# Patient Record
Sex: FEMALE | Race: WHITE | NOT HISPANIC OR LATINO | URBAN - METROPOLITAN AREA
[De-identification: names, ages, dates, MRNs, and addresses within clinical notes are randomized per-mention and may not be internally consistent; named-entity substitution may affect disease eponyms.]

---

## 2017-11-05 ENCOUNTER — INPATIENT (INPATIENT)
Facility: HOSPITAL | Age: 82
LOS: 1 days | Discharge: HOME | DRG: 312 | End: 2017-11-07
Attending: FAMILY MEDICINE | Admitting: FAMILY MEDICINE
Payer: MEDICARE

## 2017-11-05 VITALS
OXYGEN SATURATION: 98 % | RESPIRATION RATE: 18 BRPM | HEART RATE: 76 BPM | HEIGHT: 63 IN | WEIGHT: 149.91 LBS | DIASTOLIC BLOOD PRESSURE: 72 MMHG | SYSTOLIC BLOOD PRESSURE: 154 MMHG

## 2017-11-05 DIAGNOSIS — Z98.42 CATARACT EXTRACTION STATUS, LEFT EYE: Chronic | ICD-10-CM

## 2017-11-05 DIAGNOSIS — Z87.440 PERSONAL HISTORY OF URINARY (TRACT) INFECTIONS: ICD-10-CM

## 2017-11-05 DIAGNOSIS — I48.2 CHRONIC ATRIAL FIBRILLATION: ICD-10-CM

## 2017-11-05 DIAGNOSIS — D72.829 ELEVATED WHITE BLOOD CELL COUNT, UNSPECIFIED: ICD-10-CM

## 2017-11-05 DIAGNOSIS — R55 SYNCOPE AND COLLAPSE: ICD-10-CM

## 2017-11-05 DIAGNOSIS — Z87.81 PERSONAL HISTORY OF (HEALED) TRAUMATIC FRACTURE: Chronic | ICD-10-CM

## 2017-11-05 DIAGNOSIS — Z98.41 CATARACT EXTRACTION STATUS, RIGHT EYE: Chronic | ICD-10-CM

## 2017-11-05 DIAGNOSIS — N18.3 CHRONIC KIDNEY DISEASE, STAGE 3 (MODERATE): ICD-10-CM

## 2017-11-05 DIAGNOSIS — Z79.02 LONG TERM (CURRENT) USE OF ANTITHROMBOTICS/ANTIPLATELETS: ICD-10-CM

## 2017-11-05 DIAGNOSIS — R74.8 ABNORMAL LEVELS OF OTHER SERUM ENZYMES: ICD-10-CM

## 2017-11-05 DIAGNOSIS — J45.909 UNSPECIFIED ASTHMA, UNCOMPLICATED: ICD-10-CM

## 2017-11-05 DIAGNOSIS — Z96.652 PRESENCE OF LEFT ARTIFICIAL KNEE JOINT: Chronic | ICD-10-CM

## 2017-11-05 DIAGNOSIS — Z96.641 PRESENCE OF RIGHT ARTIFICIAL HIP JOINT: Chronic | ICD-10-CM

## 2017-11-05 LAB
ALBUMIN SERPL ELPH-MCNC: 3.6 G/DL — SIGNIFICANT CHANGE UP (ref 3.3–5)
ALP SERPL-CCNC: 97 U/L — SIGNIFICANT CHANGE UP (ref 30–120)
ALT FLD-CCNC: 75 U/L DA — HIGH (ref 10–60)
ANION GAP SERPL CALC-SCNC: 12 MMOL/L — SIGNIFICANT CHANGE UP (ref 5–17)
APPEARANCE UR: CLEAR — SIGNIFICANT CHANGE UP
APTT BLD: 28.5 SEC — SIGNIFICANT CHANGE UP (ref 27.5–37.4)
AST SERPL-CCNC: 72 U/L — HIGH (ref 10–40)
BACTERIA # UR AUTO: NEGATIVE — SIGNIFICANT CHANGE UP
BASOPHILS # BLD AUTO: 0.1 K/UL — SIGNIFICANT CHANGE UP (ref 0–0.2)
BASOPHILS NFR BLD AUTO: 0.5 % — SIGNIFICANT CHANGE UP (ref 0–2)
BILIRUB SERPL-MCNC: 0.7 MG/DL — SIGNIFICANT CHANGE UP (ref 0.2–1.2)
BILIRUB UR-MCNC: NEGATIVE — SIGNIFICANT CHANGE UP
BUN SERPL-MCNC: 23 MG/DL — SIGNIFICANT CHANGE UP (ref 7–23)
CALCIUM SERPL-MCNC: 9.4 MG/DL — SIGNIFICANT CHANGE UP (ref 8.4–10.5)
CHLORIDE SERPL-SCNC: 99 MMOL/L — SIGNIFICANT CHANGE UP (ref 96–108)
CK MB BLD-MCNC: 1.4 % — SIGNIFICANT CHANGE UP (ref 0–3.5)
CK MB BLD-MCNC: 1.5 % — SIGNIFICANT CHANGE UP (ref 0–3.5)
CK MB CFR SERPL CALC: 1.3 NG/ML — SIGNIFICANT CHANGE UP (ref 0–3.6)
CK MB CFR SERPL CALC: 1.4 NG/ML — SIGNIFICANT CHANGE UP (ref 0–3.6)
CK SERPL-CCNC: 92 U/L — SIGNIFICANT CHANGE UP (ref 26–192)
CK SERPL-CCNC: 93 U/L — SIGNIFICANT CHANGE UP (ref 26–192)
CO2 SERPL-SCNC: 27 MMOL/L — SIGNIFICANT CHANGE UP (ref 22–31)
COLOR SPEC: YELLOW — SIGNIFICANT CHANGE UP
CREAT SERPL-MCNC: 1.14 MG/DL — SIGNIFICANT CHANGE UP (ref 0.5–1.3)
DIFF PNL FLD: ABNORMAL
EOSINOPHIL # BLD AUTO: 0.2 K/UL — SIGNIFICANT CHANGE UP (ref 0–0.5)
EOSINOPHIL NFR BLD AUTO: 1.3 % — SIGNIFICANT CHANGE UP (ref 0–6)
EPI CELLS # UR: SIGNIFICANT CHANGE UP
GLUCOSE BLDC GLUCOMTR-MCNC: 122 MG/DL — HIGH (ref 70–99)
GLUCOSE SERPL-MCNC: 113 MG/DL — HIGH (ref 70–99)
GLUCOSE UR QL: NEGATIVE MG/DL — SIGNIFICANT CHANGE UP
HCT VFR BLD CALC: 46.3 % — HIGH (ref 34.5–45)
HGB BLD-MCNC: 14 G/DL — SIGNIFICANT CHANGE UP (ref 11.5–15.5)
INR BLD: 1.15 RATIO — SIGNIFICANT CHANGE UP (ref 0.88–1.16)
KETONES UR-MCNC: NEGATIVE — SIGNIFICANT CHANGE UP
LEUKOCYTE ESTERASE UR-ACNC: ABNORMAL
LYMPHOCYTES # BLD AUTO: 20.1 % — SIGNIFICANT CHANGE UP (ref 13–44)
LYMPHOCYTES # BLD AUTO: 3.3 K/UL — SIGNIFICANT CHANGE UP (ref 1–3.3)
MCHC RBC-ENTMCNC: 26 PG — LOW (ref 27–34)
MCHC RBC-ENTMCNC: 30.2 GM/DL — LOW (ref 32–36)
MCV RBC AUTO: 86 FL — SIGNIFICANT CHANGE UP (ref 80–100)
MONOCYTES # BLD AUTO: 0.6 K/UL — SIGNIFICANT CHANGE UP (ref 0–0.9)
MONOCYTES NFR BLD AUTO: 3.9 % — SIGNIFICANT CHANGE UP (ref 2–14)
NEUTROPHILS # BLD AUTO: 12.2 K/UL — HIGH (ref 1.8–7.4)
NEUTROPHILS NFR BLD AUTO: 74.2 % — SIGNIFICANT CHANGE UP (ref 43–77)
NITRITE UR-MCNC: NEGATIVE — SIGNIFICANT CHANGE UP
PH UR: 6 — SIGNIFICANT CHANGE UP (ref 5–8)
PLATELET # BLD AUTO: 381 K/UL — SIGNIFICANT CHANGE UP (ref 150–400)
POTASSIUM SERPL-MCNC: 4.2 MMOL/L — SIGNIFICANT CHANGE UP (ref 3.5–5.3)
POTASSIUM SERPL-SCNC: 4.2 MMOL/L — SIGNIFICANT CHANGE UP (ref 3.5–5.3)
PROT SERPL-MCNC: 7.5 G/DL — SIGNIFICANT CHANGE UP (ref 6–8.3)
PROT UR-MCNC: 100 MG/DL
PROTHROM AB SERPL-ACNC: 12.6 SEC — SIGNIFICANT CHANGE UP (ref 9.8–12.7)
RBC # BLD: 5.39 M/UL — HIGH (ref 3.8–5.2)
RBC # FLD: 13.5 % — SIGNIFICANT CHANGE UP (ref 10.3–14.5)
RBC CASTS # UR COMP ASSIST: NEGATIVE /HPF — SIGNIFICANT CHANGE UP (ref 0–4)
SODIUM SERPL-SCNC: 138 MMOL/L — SIGNIFICANT CHANGE UP (ref 135–145)
SP GR SPEC: 1.01 — SIGNIFICANT CHANGE UP (ref 1.01–1.02)
TROPONIN I SERPL-MCNC: 0 NG/ML — LOW (ref 0.02–0.06)
TROPONIN I SERPL-MCNC: 0.01 NG/ML — LOW (ref 0.02–0.06)
UROBILINOGEN FLD QL: NEGATIVE MG/DL — SIGNIFICANT CHANGE UP
WBC # BLD: 16.4 K/UL — HIGH (ref 3.8–10.5)
WBC # FLD AUTO: 16.4 K/UL — HIGH (ref 3.8–10.5)
WBC UR QL: SIGNIFICANT CHANGE UP

## 2017-11-05 PROCEDURE — 70450 CT HEAD/BRAIN W/O DYE: CPT | Mod: 26

## 2017-11-05 PROCEDURE — 99291 CRITICAL CARE FIRST HOUR: CPT

## 2017-11-05 PROCEDURE — 93010 ELECTROCARDIOGRAM REPORT: CPT

## 2017-11-05 PROCEDURE — 71101 X-RAY EXAM UNILAT RIBS/CHEST: CPT | Mod: 26

## 2017-11-05 RX ORDER — FLUTICASONE PROPIONATE AND SALMETEROL 50; 250 UG/1; UG/1
1 POWDER ORAL; RESPIRATORY (INHALATION)
Qty: 0 | Refills: 0 | Status: DISCONTINUED | OUTPATIENT
Start: 2017-11-05 | End: 2017-11-07

## 2017-11-05 RX ORDER — ACETAMINOPHEN 500 MG
650 TABLET ORAL EVERY 6 HOURS
Qty: 0 | Refills: 0 | Status: DISCONTINUED | OUTPATIENT
Start: 2017-11-05 | End: 2017-11-07

## 2017-11-05 RX ORDER — DRONEDARONE 400 MG/1
400 TABLET, FILM COATED ORAL ONCE
Qty: 0 | Refills: 0 | Status: COMPLETED | OUTPATIENT
Start: 2017-11-05 | End: 2017-11-05

## 2017-11-05 RX ORDER — LORATADINE 10 MG/1
10 TABLET ORAL DAILY
Qty: 0 | Refills: 0 | Status: DISCONTINUED | OUTPATIENT
Start: 2017-11-05 | End: 2017-11-07

## 2017-11-05 RX ORDER — RIVAROXABAN 15 MG-20MG
15 KIT ORAL
Qty: 0 | Refills: 0 | Status: DISCONTINUED | OUTPATIENT
Start: 2017-11-06 | End: 2017-11-07

## 2017-11-05 RX ORDER — BUDESONIDE AND FORMOTEROL FUMARATE DIHYDRATE 160; 4.5 UG/1; UG/1
2 AEROSOL RESPIRATORY (INHALATION)
Qty: 0 | Refills: 0 | Status: DISCONTINUED | OUTPATIENT
Start: 2017-11-05 | End: 2017-11-05

## 2017-11-05 RX ORDER — SERTRALINE 25 MG/1
50 TABLET, FILM COATED ORAL AT BEDTIME
Qty: 0 | Refills: 0 | Status: DISCONTINUED | OUTPATIENT
Start: 2017-11-05 | End: 2017-11-07

## 2017-11-05 RX ORDER — DRONEDARONE 400 MG/1
400 TABLET, FILM COATED ORAL
Qty: 0 | Refills: 0 | Status: DISCONTINUED | OUTPATIENT
Start: 2017-11-05 | End: 2017-11-07

## 2017-11-05 RX ORDER — ACETAMINOPHEN 500 MG
650 TABLET ORAL ONCE
Qty: 0 | Refills: 0 | Status: COMPLETED | OUTPATIENT
Start: 2017-11-05 | End: 2017-11-05

## 2017-11-05 RX ADMIN — Medication 0.5 MILLIGRAM(S): at 13:35

## 2017-11-05 RX ADMIN — Medication 0.25 MILLIGRAM(S): at 22:23

## 2017-11-05 RX ADMIN — Medication 650 MILLIGRAM(S): at 17:16

## 2017-11-05 RX ADMIN — DRONEDARONE 400 MILLIGRAM(S): 400 TABLET, FILM COATED ORAL at 16:30

## 2017-11-05 RX ADMIN — Medication 10 MILLIGRAM(S): at 16:30

## 2017-11-05 RX ADMIN — SERTRALINE 50 MILLIGRAM(S): 25 TABLET, FILM COATED ORAL at 22:24

## 2017-11-05 RX ADMIN — FLUTICASONE PROPIONATE AND SALMETEROL 1 DOSE(S): 50; 250 POWDER ORAL; RESPIRATORY (INHALATION) at 18:46

## 2017-11-05 RX ADMIN — Medication 650 MILLIGRAM(S): at 17:46

## 2017-11-05 NOTE — H&P ADULT - FAMILY HISTORY
Father  Still living? No  Family history of Hodgkin's disease, Age at diagnosis: 61-70     Mother  Still living? Unknown  Family history of pneumonia, Age at diagnosis: 21-30     Sibling  Still living? Unknown  Family history of pancreatic cancer, Age at diagnosis: 71-80

## 2017-11-05 NOTE — ED PROVIDER NOTE - OBJECTIVE STATEMENT
89 y/o F presents to the ED BIB EMS from home for generalized weakness, lightheadedness and fall today. Pt's daughter states that pt is normally lucid and sharp and lives alone. Today, pt called daughter at about 1100, sounding weak and short of breath. Daughter drove over to pt's home and found her, fully dressed and with rollers in her hair, sitting, leaning over, at the kitchen table appearing weak. Pt explains that she had gotten ready in the morning, and was having a BM in the restroom when she suddenly felt lightheaded and fell off the toilet. She denies hitting her head but notes that she hit her L arm. Notes L arm and R leg pain. She got off the floor and made her way over the kitchen but felt too lightheaded and had to sit down, and called her daughter. Pt also notes that her tongue is hurting at the moment. Pt takes Xarelto 15 mg (hx of Afib), Multaq, Advair., Zoloft. Hx of asthma, had a flare up this week. Daughter notes that pt is having difficulty speaking. Daughter notes pt did not vomit since daughter got to the scene, unaware if she did prior. PMHx: asthma, multiple falls. PSHx: hip replacement, femur surgery, knee replacement, cataract (1 year ago).

## 2017-11-05 NOTE — H&P ADULT - PMH
Asthma    Atrial fibrillation Asthma    Atrial fibrillation    History of hip replacement, total, right    Macular degeneration (senile) of retina    Presbycusis of both ears  hearing aid

## 2017-11-05 NOTE — ED PROVIDER NOTE - PSYCHIATRIC, MLM
Alert and oriented to person, place, time/situation. appears anxious. no apparent risk to self or others.

## 2017-11-05 NOTE — ED PROVIDER NOTE - MUSCULOSKELETAL, MLM
Spine appears normal, range of motion is not limited, tenderness over L rib area. Spine appears normal, range of motion is not limited, tenderness over L rib area, tenderness over L thigh.

## 2017-11-05 NOTE — H&P ADULT - HISTORY OF PRESENT ILLNESS
· HPI Objective Statement: 89 y/o F presents to the ED BIB EMS from home for generalized weakness, lightheadedness and fall today. Pt's daughter states that pt is normally lucid and sharp and lives alone. Today, pt called daughter at about 1100, sounding weak and short of breath. Daughter drove over to pt's home and found her, fully dressed and with rollers in her hair, sitting, leaning over, at the kitchen table appearing weak. Pt explains that she had gotten ready in the morning, and was having a BM in the restroom when she suddenly felt lightheaded and fell off the toilet. She denies hitting her head but notes that she hit her L arm. Notes L arm and R leg pain. She got off the floor and made her way over the kitchen but felt too lightheaded and had to sit down, and called her daughter. Pt also notes that her tongue is hurting at the moment. Pt takes Xarelto 15 mg (hx of Afib), Multaq, Advair., Zoloft. Hx of asthma, had a flare up this week. Daughter notes that pt is having difficulty speaking. Daughter notes pt did not vomit since daughter got to the scene, unaware if she did prior. PMHx: asthma, multiple falls. PSHx: hip replacement, femur surgery, knee replacement, cataract (1 year ago).	  · Presenting Symptoms: lightheaded, generalized weakness	  · Negative Findings: no blurred vision	  · Timing: sudden onset	  · Duration: today	  · Context: unknown	  · Aggravating Factors: none	  · Relieving Factors: none · HPI Objective Statement: 89 y/o F presents to the ED BIB EMS from home for generalized weakness, lightheadedness and fall today. Pt's daughter states that pt is normally lucid and sharp and lives alone. Today, pt called daughter at about 1100, sounding weak and short of breath. Daughter drove over to pt's home and found her, fully dressed and with rollers in her hair, sitting, leaning over, at the kitchen table appearing weak. Pt explains that she had gotten ready in the morning, and was having a BM in the restroom when she suddenly felt lightheaded and fell off the toilet. She denies hitting her head but notes that she hit her L arm. Notes L arm and R leg pain. She got off the floor and made her way over the kitchen but felt too lightheaded and had to sit down, and called her daughter. Pt also notes that her tongue is hurting at the moment. Pt takes Xarelto 15 mg (hx of Afib), Multaq, Advair., Zoloft. Hx of asthma, had a flare up this week. Daughter notes that pt is having difficulty speaking. Daughter notes pt did not vomit since daughter got to the scene, unaware if she did prior. PMHx: asthma, multiple falls. PSHx: hip replacement, femur surgery, knee replacement, cataract (1 year ago).	  · Presenting Symptoms: lightheaded, generalized weakness	  · Negative Findings: no blurred vision	  · Timing: sudden onset	  · Duration: today	  · Context: unknown	  · Aggravating Factors: none	  · Relieving Factors: none	  H/o of UTI's, WBC 16k, no fever, or dysuria

## 2017-11-05 NOTE — ED PROVIDER NOTE - CRANIAL NERVE AND PUPILLARY EXAM
tongue is midline/extra-ocular movements intact/no facial droop/cranial nerves 2-12 intact/central and peripheral vision intact extra-ocular movements intact/cranial nerves 2-12 intact/tongue is midline/no facial droop, no  nystagmus, good  strength b/l./central and peripheral vision intact

## 2017-11-05 NOTE — ED PROVIDER NOTE - ENMT, MLM
Airway patent, Nasal mucosa clear. Mouth with dry mucosa. Throat has no vesicles, no oropharyngeal exudates and uvula is midline. old scarring noted by R ear secondary to prior injuries.

## 2017-11-05 NOTE — ED ADULT NURSE REASSESSMENT NOTE - NS ED NURSE REASSESS COMMENT FT1
pt comfortable on stretcher skin warm and dry able to swallow fluids moves all extremities perrl pt gets anxious as per family pt medicated for anxiety per md  pt aaox3 skin warm and dry no resp distress lungs clear and equal b/l ascultation abd soft non tender + bs

## 2017-11-05 NOTE — H&P ADULT - PSH
History of hip fracture  left hip, additional femoral rad for distal fx.  History of hip replacement, total, right    History of knee replacement, total, left    Status post cataract surgery, left    Status post cataract surgery, right

## 2017-11-05 NOTE — ED ADULT NURSE REASSESSMENT NOTE - NS ED NURSE REASSESS COMMENT FT1
pt sleeping easily arousable perrl magallon fully equal strength b/l lungs clear and equal b/l pt offers no c/o pt states hungry pt pending adnission

## 2017-11-05 NOTE — CONSULT NOTE ADULT - ASSESSMENT
89y/o w/f seen at [facility] ER.  Daughter and Son-in-law at bedside.  History A-Fib (chronic for at least 1 year), COPD-asthma, reform smoker, falls  S/P B/L hip surgery.  S/P left knee surgery.  S/P B/L cataract surgery.   About 3 years ago Dr. Echevarria did Cardiac catheterization without intervention at [facility].    Admitted today patient felt lightheaded and then had gone to the bathroom where she became more lightheaded and weak.   To the point that she had to slide off the toilet.  No other associated symptoms.    Impression:    As above, lightheaded and weak.    Possible vaso-vagal syncope.    Plan:  - R/O MI protocol  - Echocardiogram.  - Carotid sonogram.  - Orthostatics.  - Risks and benefits of Xarelto fully discussed.  - Patient and family will discuss with their Cardiologist about Multaq.  - Neurology consult.

## 2017-11-05 NOTE — ED PROVIDER NOTE - DIAGNOSTIC INTERPRETATION
Bilateral cataract surgery , partially empty sella syndrome. Basal Ganglia calcification , mild chronic ischemic white matter changes. no bleed or mass effect.

## 2017-11-05 NOTE — ED PROVIDER NOTE - MEDICAL DECISION MAKING DETAILS
Will evaluate etiology of pt's near syncopal episode today and check labs, and x-rays as appropriate. Reassess, and treat accordingly.

## 2017-11-05 NOTE — ED PROVIDER NOTE - CONSTITUTIONAL, MLM
normal... anxious appearing, questioning if she's okay and states that she is scared. awake, alert, oriented to person, place, time/situation.

## 2017-11-06 ENCOUNTER — TRANSCRIPTION ENCOUNTER (OUTPATIENT)
Age: 82
End: 2017-11-06

## 2017-11-06 DIAGNOSIS — I48.91 UNSPECIFIED ATRIAL FIBRILLATION: ICD-10-CM

## 2017-11-06 LAB
ALT FLD-CCNC: 54 U/L DA — SIGNIFICANT CHANGE UP (ref 10–60)
AST SERPL-CCNC: 35 U/L — SIGNIFICANT CHANGE UP (ref 10–40)
BASOPHILS # BLD AUTO: 0.1 K/UL — SIGNIFICANT CHANGE UP (ref 0–0.2)
BASOPHILS NFR BLD AUTO: 0.7 % — SIGNIFICANT CHANGE UP (ref 0–2)
CK MB BLD-MCNC: 1 % — SIGNIFICANT CHANGE UP (ref 0–3.5)
CK MB CFR SERPL CALC: 1.3 NG/ML — SIGNIFICANT CHANGE UP (ref 0–3.6)
CK SERPL-CCNC: 127 U/L — SIGNIFICANT CHANGE UP (ref 26–192)
CULTURE RESULTS: NO GROWTH — SIGNIFICANT CHANGE UP
EOSINOPHIL # BLD AUTO: 0.1 K/UL — SIGNIFICANT CHANGE UP (ref 0–0.5)
EOSINOPHIL NFR BLD AUTO: 1 % — SIGNIFICANT CHANGE UP (ref 0–6)
GGT SERPL-CCNC: 15 U/L — SIGNIFICANT CHANGE UP (ref 8–40)
HCT VFR BLD CALC: 38.7 % — SIGNIFICANT CHANGE UP (ref 34.5–45)
HGB BLD-MCNC: 12.6 G/DL — SIGNIFICANT CHANGE UP (ref 11.5–15.5)
LYMPHOCYTES # BLD AUTO: 19.9 % — SIGNIFICANT CHANGE UP (ref 13–44)
LYMPHOCYTES # BLD AUTO: 2.5 K/UL — SIGNIFICANT CHANGE UP (ref 1–3.3)
MCHC RBC-ENTMCNC: 28 PG — SIGNIFICANT CHANGE UP (ref 27–34)
MCHC RBC-ENTMCNC: 32.4 GM/DL — SIGNIFICANT CHANGE UP (ref 32–36)
MCV RBC AUTO: 86.3 FL — SIGNIFICANT CHANGE UP (ref 80–100)
MONOCYTES # BLD AUTO: 0.9 K/UL — SIGNIFICANT CHANGE UP (ref 0–0.9)
MONOCYTES NFR BLD AUTO: 7.4 % — SIGNIFICANT CHANGE UP (ref 2–14)
NEUTROPHILS # BLD AUTO: 8.8 K/UL — HIGH (ref 1.8–7.4)
NEUTROPHILS NFR BLD AUTO: 71.1 % — SIGNIFICANT CHANGE UP (ref 43–77)
PLATELET # BLD AUTO: 278 K/UL — SIGNIFICANT CHANGE UP (ref 150–400)
RBC # BLD: 4.48 M/UL — SIGNIFICANT CHANGE UP (ref 3.8–5.2)
RBC # FLD: 13.3 % — SIGNIFICANT CHANGE UP (ref 10.3–14.5)
SPECIMEN SOURCE: SIGNIFICANT CHANGE UP
TROPONIN I SERPL-MCNC: 0.01 NG/ML — LOW (ref 0.02–0.06)
WBC # BLD: 12.4 K/UL — HIGH (ref 3.8–10.5)
WBC # FLD AUTO: 12.4 K/UL — HIGH (ref 3.8–10.5)

## 2017-11-06 PROCEDURE — 93010 ELECTROCARDIOGRAM REPORT: CPT

## 2017-11-06 PROCEDURE — 93880 EXTRACRANIAL BILAT STUDY: CPT | Mod: 26

## 2017-11-06 PROCEDURE — 76700 US EXAM ABDOM COMPLETE: CPT | Mod: 26

## 2017-11-06 RX ADMIN — Medication 0.5 MILLIGRAM(S): at 23:30

## 2017-11-06 RX ADMIN — FLUTICASONE PROPIONATE AND SALMETEROL 1 DOSE(S): 50; 250 POWDER ORAL; RESPIRATORY (INHALATION) at 18:47

## 2017-11-06 RX ADMIN — Medication 10 MILLIGRAM(S): at 17:57

## 2017-11-06 RX ADMIN — RIVAROXABAN 15 MILLIGRAM(S): KIT at 17:57

## 2017-11-06 RX ADMIN — DRONEDARONE 400 MILLIGRAM(S): 400 TABLET, FILM COATED ORAL at 05:54

## 2017-11-06 RX ADMIN — Medication 650 MILLIGRAM(S): at 12:31

## 2017-11-06 RX ADMIN — SERTRALINE 50 MILLIGRAM(S): 25 TABLET, FILM COATED ORAL at 21:28

## 2017-11-06 RX ADMIN — LORATADINE 10 MILLIGRAM(S): 10 TABLET ORAL at 21:28

## 2017-11-06 RX ADMIN — DRONEDARONE 400 MILLIGRAM(S): 400 TABLET, FILM COATED ORAL at 17:57

## 2017-11-06 RX ADMIN — FLUTICASONE PROPIONATE AND SALMETEROL 1 DOSE(S): 50; 250 POWDER ORAL; RESPIRATORY (INHALATION) at 06:39

## 2017-11-06 RX ADMIN — Medication 650 MILLIGRAM(S): at 21:28

## 2017-11-06 RX ADMIN — Medication 650 MILLIGRAM(S): at 01:22

## 2017-11-06 NOTE — PROGRESS NOTE ADULT - ASSESSMENT
Assessment and Recommendation:   · Assessment		  fall lightheadedness suspect presyncopal event possible vasovagal along with possible painic attack  no signs to suggest cva or clear h/o of seizures  tele eval  monitor sbp  echo  cd  spoke to daughter   neurology wise stable     · Assessment		  89 year old female, chronic A Fib, with probable Vaso Vagal syncope.  No acute Cardiac event.  Grossly  normal Echo for her age.  Tele = chronic A Fib with good ventricular response.    Problem/Plan - 1:  ·  Problem: Syncope, near.  Plan: Probably vaso Vagal.     Problem/Plan - 2:  ·  Problem: Atrial fibrillation.  Plan: Good Ventricular response on Dronedarone. Assessment and Recommendation:   · Assessment		  fall lightheadedness suspect presyncopal event possible vasovagal along with possible painic attack  no signs to suggest cva or clear h/o of seizures  tele eval  monitor sbp  echo  cd  spoke to daughter   neurology wise stable     · Assessment		  89 year old female, chronic A Fib, with probable Vaso Vagal syncope.  No acute Cardiac event.  Grossly  normal Echo for her age.  Tele = chronic A Fib with good ventricular response.    Problem/Plan - 1:  ·  Problem: Syncope, near.  Plan: Probably vaso Vagal.     Problem/Plan - 2:  ·  Problem: Atrial fibrillation.  Plan: Good Ventricular response on Dronedarone.       Clinical Impressions:   · Criteria for Skilled Therapeutic Interventions	impairments found; functional limitations in following categories	  · Impairments Found (describe specific impairments)	gait, locomotion, and balance	  · Functional Limitations in Following Categories (describe specific limitations)	home management	  · Rehab Potential	good, to achieve stated therapy goals	  · Therapy Frequency	3-5x/week	  · Predicted Duration of Therapy Intervention (days/wks)	3-5 days	  · Anticipated Discharge Recommendations	home w/ home PT	    General Interventions:   · Planned Therapy Interventions	gait training; bed mobility training; balance training; transfer training	  · Bed Mobility Training	supine to sit independently in 3-5 days	  · Transfer Training	sit to stand independently in 3-5 days	  · Gait Training	ambulate independently with AAD if needed for 150 ft in 3-5 days	  · Balance Training	improve balance in all areas 1/2 grade in 3-5 days	      No evidence of intrinsic hepatic, renal or vascular disorder. ^ WBC on steroids & post syncopal event, likely reactive leukemoid rx.  s/p Asthma exacerbation on INH & tapering off steroids.  On chronic A/C x non-valvular atrial fibrillation.

## 2017-11-06 NOTE — DISCHARGE NOTE ADULT - MEDICATION SUMMARY - MEDICATIONS TO STOP TAKING
I will STOP taking the medications listed below when I get home from the hospital:    Zoloft 50 mg oral tablet  -- 1 tab(s) by mouth once a day (at bedtime)    predniSONE 10 mg oral tablet  -- 1 tab(s) by mouth once a day only until tomorrow

## 2017-11-06 NOTE — INPATIENT CERTIFICATION FOR MEDICARE PATIENTS - RISKS OF ADVERSE EVENTS
Concern for neurologic deterioration/Concern for delay in diagnosis and treatment/Concern for cardiopulmonary deterioration safety at home, lives alone./Concern for cardiopulmonary deterioration/Concern for delay in diagnosis and treatment/Concern for neurologic deterioration/Other:

## 2017-11-06 NOTE — DISCHARGE NOTE ADULT - NS AS DC FOLLOWUP STROKE INST
Stroke (includes: TIA/SAH/ICH/Ischemic Stroke)/Atrial Fibrillation is a risk factor for Stroke Atrial Fibrillation is a risk factor for Stroke

## 2017-11-06 NOTE — DISCHARGE NOTE ADULT - CARE PLAN
Principal Discharge DX:	Vasovagal syncope  Goal:	Orthostatic blood pressure change precautions  Instructions for follow-up, activity and diet:	DASH/ TLC  Secondary Diagnosis:	Chronic atrial fibrillation  Secondary Diagnosis:	Long term (current) use of antithrombotics/antiplatelets  Secondary Diagnosis:	Uncomplicated asthma, unspecified asthma severity, unspecified whether persistent  Secondary Diagnosis:	Abnormal liver enzymes  Secondary Diagnosis:	Renal failure, chronic, stage 3 (moderate)  Secondary Diagnosis:	Presbycusis of both ears

## 2017-11-06 NOTE — DISCHARGE NOTE ADULT - CARE PROVIDER_API CALL
Pippa Villarreal), Neurology  [address] [place] [address]  Phone: [phone]  Fax: [phone] Pippa Villarreal), Neurology  [address] [facility]  [address]  [place] [address]  Phone: [phone]  Fax: [phone]    Tima Nieto), Family Practice Medicine  [address] [place] [id number]  Phone: [phone]  Fax: [phone]    Yovany Magana), Internal Medicine  [address] [place] [address]  [address]  [place] [address]  Phone: [phone]  Fax: [phone]

## 2017-11-06 NOTE — DISCHARGE NOTE ADULT - HOME CARE AGENCY
[facility] - ([phone])  Nurse to visit the day after hospital discharge; physical therapist to follow. Please contact the home care agency at the above phone number if you have not heard from them by 12 noon on the day after your hospital discharge.

## 2017-11-06 NOTE — PROGRESS NOTE ADULT - SUBJECTIVE AND OBJECTIVE BOX
INTERVAL HPI /OVERNIGHT EVENTS:  Pt's daughter mentions pt was on a tapering steroid tx, & is missing todays dose     HEALTH ISSUES - PROBLEM Dx:  Atrial fibrillation: Atrial fibrillation  Personal history of urinary infection: Personal history of urinary infection  Leukocytosis, unspecified type: Leukocytosis, unspecified type  Renal failure, chronic, stage 3 (moderate): Renal failure, chronic, stage 3 (moderate)  Abnormal liver enzymes: Abnormal liver enzymes  Uncomplicated asthma, unspecified asthma severity, unspecified whether persistent: Uncomplicated asthma, unspecified asthma severity, unspecified whether persistent  Long term (current) use of antithrombotics/antiplatelets: Long term (current) use of antithrombotics/antiplatelets  Chronic atrial fibrillation: Chronic atrial fibrillation  Vasovagal syncope: Vasovagal syncope  Syncope, near: Syncope, near      PAST MEDICAL & SURGICAL HISTORY:  History of hip replacement, total, right  Presbycusis of both ears: hearing aid  Macular degeneration (senile) of retina  Atrial fibrillation  Asthma  Status post cataract surgery, right  Status post cataract surgery, left  History of hip fracture: left hip, additional femoral rad for distal fx.  History of knee replacement, total, left  History of hip replacement, total, right      VITAL SIGNS:  Vital Signs Last 24 Hrs  T(C): 37.2 (2017 14:17), Max: 37.2 (2017 14:17)  T(F): 98.9 (2017 14:17), Max: 98.9 (2017 14:17)  HR: 80 (2017 14:17) (69 - 107)  BP: 112/65 (2017 14:17) (112/65 - 155/76)  BP(mean): --  RR: 17 (2017 14:17) (17 - 18)  SpO2: 97% (2017 14:17) (95% - 98%)  PHYSICAL EXAM:    Constitutional:  Eyes:  ENMT:  Neck:  Respiratory:  Cardiovascular:  Gastrointestinal:  Extremities:  Vascular:  Neurological:  Musculoskeletal:    TELEMETRY: Atrial fib, mVR, no other significant findings, artifact, rapid HR /c movement.    EKG: A Fib [irregular irregular, MVR, no significant repolarization changes.    MEDICATIONS  (STANDING):  dronedarone 400 milliGRAM(s) Oral <User Schedule>  fluticasone propionate/ salmeterol 500-50 MICROgram(s) Diskus 1 Dose(s) Inhalation two times a day  rivaroxaban 15 milliGRAM(s) Oral <User Schedule>  sertraline 50 milliGRAM(s) Oral at bedtime    MEDICATIONS  (PRN):  acetaminophen   Tablet 650 milliGRAM(s) Oral every 6 hours PRN Pain  loratadine 10 milliGRAM(s) Oral daily PRN Nasal Drip      Allergies    shellfish (Anaphylaxis)  sulfamethoxazole (Unknown)    Intolerances        LABS:  Creatine Kinase, Serum in AM (17 @ 07:23)    Creatine Kinase, Serum: 127 U/L    CKMB Mass Assay (17 @ 07:23)    CKMB Units: 1.3 ng/mL    CPK Mass Assay %: 1.0 %    Troponin I, Serum (17 @ 07:23)    Troponin I, Serum: .005: The new reference range for Troponin-I performed on the Siemens EXL  system is 0.017-0.056 ng/mL which includes the 99th percentile of a  healthy reference population. Studies have shown that elevated troponin  levels above the cutoff are associated with an increased risk for adverse  cardiac events, with the risk increasing as troponin levels increase.  As  per a joint committee of the American College of Cardiology and   Society of Cardiology, diagnosis of classic MI is based upon the  detection of a rise or fall of cardiac troponin values, with at least one  value above the 99th percentile upper reference limit, in the appropriate  clinical context.  · Troponin I (ng/mL) Interpretation  · 0.017-0.056  Normal range (includes the 99th percentile of a healthy  reference population)  · >0.056  Elevated troponin level indicating increased risk  · Note: Troponin-I and Troponin T cannot be used interchangeably in  serial measurements.  Minimally elevated Troponin results should be  interpreted in the context of clinical findings and risk factors. ng/mL  Gamma Glutamyl Transferase, Serum (17 @ 11:18)    Gamma Glutamyl Transferase, Serum: 15 U/L  Alanine Aminotransferase (ALT/SGPT) (17 @ 07:23)    Alanine Aminotransferase (ALT/SGPT): 54 U/L DA    Aspartate Aminotransferase (AST/SGOT) (17 @ 07:23)    Aspartate Aminotransferase (AST/SGOT): 35 U/L                                12.6   12.4  )-----------( 278      ( 2017 07:23 ) [on steroids].             38.7         138  |  99  |  23  ----------------------------<  113<H>  4.2   |  27  |  1.14    Ca    9.4      2017 13:13    TPro  x   /  Alb  x   /  TBili  x   /  DBili  x   /  AST  35  /  ALT  54  /  AlkPhos  x   11-    PT/INR - ( 2017 13:13 )   PT: 12.6 sec;   INR: 1.15 ratio         PTT - ( 2017 13:13 )  PTT:28.5 sec  Urinalysis Basic - ( 2017 16:29 )    Color: Yellow / Appearance: Clear / S.015 / pH: x  Gluc: x / Ketone: Negative  / Bili: Negative / Urobili: Negative mg/dL   Blood: x / Protein: 100 mg/dL / Nitrite: Negative   Leuk Esterase: Trace / RBC: Negative /HPF / WBC 0-2   Sq Epi: x / Non Sq Epi: Occasional / Bacteria: Negative        RADIOLOGY & ADDITIONAL TESTS:    < from: US Transthoracic Echocardiogram w/Doppler Complete (17 @ 13:04) >  NTERPRETATION:  The patient is 5 feet 3 inches tall and weighs 150   pounds. The blood pressure is 155/76. Indication is for syncope.   Technician is Heineman.    The left atrial internal diameter is 5.0 cm. The aortic root diameters   3.0 cm. The left ventricular end-diastolic diameter is 5.3 cm. The septum   is 0.8 cm. The posterior wall was 0.7 cm. The estimated ejection fraction   was 60%. Next    The mitral valve apparatus demonstrates that the leaflets are thin and   the valve opens normally in diastole. The aortic valve demonstrates mild   calcification of its cusps with adequate opening of the valve in systole.   The left atrial internal diameter was enlarged. The aortic root diameter   was grossly normal. The right atrial and right ventricular internal   diameters were probably grossly normal. The left ventricular free wall   and interventricular septal thicknesses were normal. The left ventricular   internal diameters and contractility were grossly normal with an   estimated ejection fraction of approximately 60%.    On the color flow Doppler portion of the examination 1+ aortic   insufficiency 1+ mitral insufficiency and trace tricuspid insufficiency   was noted. The pulmonary artery pressure was measured at 33 mmHg. The IVC   did not appear to be enlarged.    Impression: Grossly normal left ventricular size and function with an   estimated ejection fraction of 60%.Enlargement of the left atrium with   mild mitral insufficiency. Mild aortic insufficiency.    < end of copied text >    < from: US Duplex Carotid Arteries Complete, Bilateral (17 @ 10:41) >  INTERPRETATION:  HISTORY: A Fib, on A/C, syncope, s/p CVA    COMPARISON:None    Findings: There is mild bilateral intimal thickening. No significant   plaque identified.    Peak systolic velocities are as follows (in cm/sec):     RIGHT:    PROX CCA = 67 ;  DIST CCA = 39 ;  PROX ICA = 44 ; MID ICA = 44   ; DIST ICA = not visualized; ECA = 44     LEFT   :    PROX CCA =  66 ;  DIST CCA = 48 ;  PROX ICA = 43 ;  DIST ICA   = 40 ; ECA = 53     There is antegrade flow through both vertebral arteries.       IMPRESSION:   No visualized hemodynamically significant carotid artery   stenoses. Please note that the right distal ICA could not be evaluated.    < end of copied text >  < from: US Abdomen Complete (17 @ 10:41) >  INTERPRETATION:  CLINICAL INFORMATION: Chronic kidney disease and   elevated LFTs.    COMPARISON: None available.    TECHNIQUE: Sonography of the abdomen.     FINDINGS:    Liver: Within normal limits.    Bile ducts: Normal caliber. Common bile duct measures 5 mm.     Gallbladder: Layering sludge within the gallbladder. Gallbladder wall   thickness measures 3 mm.       Pancreas: Poorly visualized.    Spleen: 6.9 cm. Within normal limits.    Right kidney: 10.7 cm. No hydronephrosis. There is a lower pole cyst   which measures 4.1 x 4.2 x 4.5 cm.    Left kidney: 9.3 cm.  No hydronephrosis. There is a lower pole cyst which   measures 1.3 x 1.6 x 1.4cm    Ascites: None.    Aorta and IVC: Visualized portions are within normal limits.    IMPRESSION: Gallbladder sludge.    No biliary ductal dilatation.    Bilateral renal cysts.    < end of copied text >  < from: CT Head No Cont (17 @ 13:01) >  INTERPRETATION:  History: Weakness altered mental status.    Noncontrast head CT. Prior 10/16/2011.    Age-appropriate cerebral parenchymal volume loss mild chronic ischemic   white matter changes. No territorial infarct bleed extra-axial collection   or mass effect. Basal ganglia calcification. Partially empty sella. Clear   sinuses. Cranium intact. Status post bilateral cataract surgery.    Impression: No acute or focal brain pathology. Partially empty sella.      < end of copied text > INTERVAL HPI /OVERNIGHT EVENTS:    Pt says she feels still a little weak/ shaky, lives alone @ home, concern with going home tonight.  Daughter request a low dose tranquilizer @ HS to calm her down she has never been a good sleeper, shaky when walking, seen by PT,   Pt's daughter mentions pt was on a tapering steroid tx, & is missing today's dose     HEALTH ISSUES - PROBLEM Dx:  Atrial fibrillation: Atrial fibrillation  Personal history of urinary infection: Personal history of urinary infection  Leukocytosis, unspecified type: Leukocytosis, unspecified type  Renal failure, chronic, stage 3 (moderate): Renal failure, chronic, stage 3 (moderate)  Abnormal liver enzymes: Abnormal liver enzymes  Uncomplicated asthma, unspecified asthma severity, unspecified whether persistent: Uncomplicated asthma, unspecified asthma severity, unspecified whether persistent  Long term (current) use of antithrombotics/antiplatelets: Long term (current) use of antithrombotics/antiplatelets  Chronic atrial fibrillation: Chronic atrial fibrillation  Vasovagal syncope: Vasovagal syncope  Syncope, near: Syncope, near      PAST MEDICAL & SURGICAL HISTORY:  History of hip replacement, total, right  Presbycusis of both ears: hearing aid  Macular degeneration (senile) of retina  Atrial fibrillation  Asthma  Status post cataract surgery, right  Status post cataract surgery, left  History of hip fracture: left hip, additional femoral rad for distal fx.  History of knee replacement, total, left  History of hip replacement, total, right      VITAL SIGNS:  Vital Signs Last 24 Hrs  T(C): 37.2 (2017 14:17), Max: 37.2 (2017 14:17)  T(F): 98.9 (2017 14:17), Max: 98.9 (2017 14:17)  HR: 80 (2017 14:17) (69 - 107)  BP: 112/65 (2017 14:17) (112/65 - 155/76)  BP(mean): --  RR: 17 (2017 14:17) (17 - 18)  SpO2: 97% (2017 14:17) (95% - 98%)  PHYSICAL EXAM:    Constitutional: NAD, oob to chair, alert & o, [place].  Eyes: slight pale, nl vision  ENMT: no central facial weakness, hydration, slight dry.  Neck: supple, no bruit  Respiratory: unlabored, symmetric, /s adventitious sounds.  Cardiovascular: MVR, s1s2, no significant heart murmur  Gastrointestinal: soft, nontender, no distention, bs +  Extremities: no edema, cellulitis, phlebitis. .  Vascular: Normal peripheral pulses  Neurological: no gross deficit, no asymmetry.  Musculoskeletal: from, left rib cage tenderness, x-ray old 7th & ? 8th rib fx.  Skin: Nl integrity & turgor. No exanthema or stigmata.    TELEMETRY: Atrial fib, mVR, no other significant findings, artifact, rapid HR /c movement.    EKG: A Fib [irregular irregular, MVR, no significant repolarization changes.    MEDICATIONS  (STANDING):  dronedarone 400 milliGRAM(s) Oral <User Schedule>  fluticasone propionate/ salmeterol 500-50 MICROgram(s) Diskus 1 Dose(s) Inhalation two times a day  rivaroxaban 15 milliGRAM(s) Oral <User Schedule>  sertraline 50 milliGRAM(s) Oral at bedtime    MEDICATIONS  (PRN):  acetaminophen   Tablet 650 milliGRAM(s) Oral every 6 hours PRN Pain  loratadine 10 milliGRAM(s) Oral daily PRN Nasal Drip      Allergies    shellfish (Anaphylaxis)  sulfamethoxazole (Unknown)    Intolerances        LABS:  Creatine Kinase, Serum in AM (17 @ 07:23)    Creatine Kinase, Serum: 127 U/L    CKMB Mass Assay (17 @ 07:23)    CKMB Units: 1.3 ng/mL    CPK Mass Assay %: 1.0 %    Troponin I, Serum (17 @ 07:23)    Troponin I, Serum: .005: The new reference range for Troponin-I performed on the Siemens EXL  system is 0.017-0.056 ng/mL which includes the 99th percentile of a  healthy reference population. Studies have shown that elevated troponin  levels above the cutoff are associated with an increased risk for adverse  cardiac events, with the risk increasing as troponin levels increase.  As  per a joint committee of the American College of Cardiology and   Society of Cardiology, diagnosis of classic MI is based upon the  detection of a rise or fall of cardiac troponin values, with at least one  value above the 99th percentile upper reference limit, in the appropriate  clinical context.  · Troponin I (ng/mL) Interpretation  · 0.017-0.056  Normal range (includes the 99th percentile of a healthy  reference population)  · >0.056  Elevated troponin level indicating increased risk  · Note: Troponin-I and Troponin T cannot be used interchangeably in  serial measurements.  Minimally elevated Troponin results should be  interpreted in the context of clinical findings and risk factors. ng/mL  Gamma Glutamyl Transferase, Serum (17 @ 11:18)    Gamma Glutamyl Transferase, Serum: 15 U/L  Alanine Aminotransferase (ALT/SGPT) (17 @ 07:23)    Alanine Aminotransferase (ALT/SGPT): 54 U/L DA    Aspartate Aminotransferase (AST/SGOT) (17 @ 07:23)    Aspartate Aminotransferase (AST/SGOT): 35 U/L                                12.6   12.4  )-----------( 278      ( 2017 07:23 ) [on steroids].             38.7         138  |  99  |  23  ----------------------------<  113<H>  4.2   |  27  |  1.14    Ca    9.4      2017 13:13    TPro  x   /  Alb  x   /  TBili  x   /  DBili  x   /  AST  35  /  ALT  54  /  AlkPhos  x   11    PT/INR - ( 2017 13:13 )   PT: 12.6 sec;   INR: 1.15 ratio         PTT - ( 2017 13:13 )  PTT:28.5 sec  Urinalysis Basic - ( 2017 16:29 )    Color: Yellow / Appearance: Clear / S.015 / pH: x  Gluc: x / Ketone: Negative  / Bili: Negative / Urobili: Negative mg/dL   Blood: x / Protein: 100 mg/dL / Nitrite: Negative   Leuk Esterase: Trace / RBC: Negative /HPF / WBC 0-2   Sq Epi: x / Non Sq Epi: Occasional / Bacteria: Negative        RADIOLOGY & ADDITIONAL TESTS:    < from: US Transthoracic Echocardiogram w/Doppler Complete (17 @ 13:04) >  NTERPRETATION:  The patient is 5 feet 3 inches tall and weighs 150   pounds. The blood pressure is 155/76. Indication is for syncope.   Technician is Heineman.    The left atrial internal diameter is 5.0 cm. The aortic root diameters   3.0 cm. The left ventricular end-diastolic diameter is 5.3 cm. The septum   is 0.8 cm. The posterior wall was 0.7 cm. The estimated ejection fraction   was 60%. Next    The mitral valve apparatus demonstrates that the leaflets are thin and   the valve opens normally in diastole. The aortic valve demonstrates mild   calcification of its cusps with adequate opening of the valve in systole.   The left atrial internal diameter was enlarged. The aortic root diameter   was grossly normal. The right atrial and right ventricular internal   diameters were probably grossly normal. The left ventricular free wall   and interventricular septal thicknesses were normal. The left ventricular   internal diameters and contractility were grossly normal with an   estimated ejection fraction of approximately 60%.    On the color flow Doppler portion of the examination 1+ aortic   insufficiency 1+ mitral insufficiency and trace tricuspid insufficiency   was noted. The pulmonary artery pressure was measured at 33 mmHg. The IVC   did not appear to be enlarged.    Impression: Grossly normal left ventricular size and function with an   estimated ejection fraction of 60%.Enlargement of the left atrium with   mild mitral insufficiency. Mild aortic insufficiency.    < end of copied text >    < from: US Duplex Carotid Arteries Complete, Bilateral (17 @ 10:41) >  INTERPRETATION:  HISTORY: A Fib, on A/C, syncope, s/p CVA    COMPARISON:None    Findings: There is mild bilateral intimal thickening. No significant   plaque identified.    Peak systolic velocities are as follows (in cm/sec):     RIGHT:    PROX CCA = 67 ;  DIST CCA = 39 ;  PROX ICA = 44 ; MID ICA = 44   ; DIST ICA = not visualized; ECA = 44     LEFT   :    PROX CCA =  66 ;  DIST CCA = 48 ;  PROX ICA = 43 ;  DIST ICA   = 40 ; ECA = 53     There is antegrade flow through both vertebral arteries.       IMPRESSION:   No visualized hemodynamically significant carotid artery   stenoses. Please note that the right distal ICA could not be evaluated.    < end of copied text >  < from: US Abdomen Complete (17 @ 10:41) >  INTERPRETATION:  CLINICAL INFORMATION: Chronic kidney disease and   elevated LFTs.    COMPARISON: None available.    TECHNIQUE: Sonography of the abdomen.     FINDINGS:    Liver: Within normal limits.    Bile ducts: Normal caliber. Common bile duct measures 5 mm.     Gallbladder: Layering sludge within the gallbladder. Gallbladder wall   thickness measures 3 mm.       Pancreas: Poorly visualized.    Spleen: 6.9 cm. Within normal limits.    Right kidney: 10.7 cm. No hydronephrosis. There is a lower pole cyst   which measures 4.1 x 4.2 x 4.5 cm.    Left kidney: 9.3 cm.  No hydronephrosis. There is a lower pole cyst which   measures 1.3 x 1.6 x 1.4cm    Ascites: None.    Aorta and IVC: Visualized portions are within normal limits.    IMPRESSION: Gallbladder sludge.    No biliary ductal dilatation.    Bilateral renal cysts.    < end of copied text >  < from: CT Head No Cont (11.05.17 @ 13:01) >  INTERPRETATION:  History: Weakness altered mental status.    Noncontrast head CT. Prior 10/16/2011.    Age-appropriate cerebral parenchymal volume loss mild chronic ischemic   white matter changes. No territorial infarct bleed extra-axial collection   or mass effect. Basal ganglia calcification. Partially empty sella. Clear   sinuses. Cranium intact. Status post bilateral cataract surgery.    Impression: No acute or focal brain pathology. Partially empty sella.      < end of copied text > INTERVAL HPI /OVERNIGHT EVENTS:    Pt says she feels still a little weak/ shaky, lives alone @ home, concern with going home tonight.  Daughter request a low dose tranquilizer @ HS to calm her down she has never been a good sleeper, shaky when walking, seen by PT,   Orthostatic VS shows changes of 22 or higher systolic drop.  Pt's daughter mentions pt was on a tapering steroid tx, & is missing today's dose     HEALTH ISSUES - PROBLEM Dx:  Atrial fibrillation: Atrial fibrillation  Personal history of urinary infection: Personal history of urinary infection  Leukocytosis, unspecified type: Leukocytosis, unspecified type  Renal failure, chronic, stage 3 (moderate): Renal failure, chronic, stage 3 (moderate)  Abnormal liver enzymes: Abnormal liver enzymes  Uncomplicated asthma, unspecified asthma severity, unspecified whether persistent: Uncomplicated asthma, unspecified asthma severity, unspecified whether persistent  Long term (current) use of antithrombotics/antiplatelets: Long term (current) use of antithrombotics/antiplatelets  Chronic atrial fibrillation: Chronic atrial fibrillation  Vasovagal syncope: Vasovagal syncope  Syncope, near: Syncope, near      PAST MEDICAL & SURGICAL HISTORY:  History of hip replacement, total, right  Presbycusis of both ears: hearing aid  Macular degeneration (senile) of retina  Atrial fibrillation  Asthma  Status post cataract surgery, right  Status post cataract surgery, left  History of hip fracture: left hip, additional femoral rad for distal fx.  History of knee replacement, total, left  History of hip replacement, total, right      VITAL SIGNS:  Vital Signs Last 24 Hrs  T(C): 37.2 (2017 14:17), Max: 37.2 (2017 14:17)  T(F): 98.9 (2017 14:17), Max: 98.9 (2017 14:17)  HR: 80 (2017 14:17) (69 - 107)  BP: 112/65 (2017 14:17) (112/65 - 155/76)  BP(mean): --  RR: 17 (2017 14:17) (17 - 18)  SpO2: 97% (2017 14:17) (95% - 98%)  PHYSICAL EXAM:    Constitutional: NAD, oob to chair, alert & o, [place].  Eyes: slight pale, nl vision  ENMT: no central facial weakness, hydration, slight dry.  Neck: supple, no bruit  Respiratory: unlabored, symmetric, /s adventitious sounds.  Cardiovascular: MVR, s1s2, no significant heart murmur  Gastrointestinal: soft, nontender, no distention, bs +  Extremities: no edema, cellulitis, phlebitis. .  Vascular: Normal peripheral pulses  Neurological: no gross deficit, no asymmetry.  Musculoskeletal: from, left rib cage tenderness, x-ray old 7th & ? 8th rib fx.  Skin: Nl integrity & turgor. No exanthema or stigmata.    TELEMETRY: Atrial fib, mVR, no other significant findings, artifact, rapid HR /c movement.    EKG: A Fib [irregular irregular, MVR, no significant repolarization changes.    MEDICATIONS  (STANDING):  dronedarone 400 milliGRAM(s) Oral <User Schedule>  fluticasone propionate/ salmeterol 500-50 MICROgram(s) Diskus 1 Dose(s) Inhalation two times a day  rivaroxaban 15 milliGRAM(s) Oral <User Schedule>  sertraline 50 milliGRAM(s) Oral at bedtime    MEDICATIONS  (PRN):  acetaminophen   Tablet 650 milliGRAM(s) Oral every 6 hours PRN Pain  loratadine 10 milliGRAM(s) Oral daily PRN Nasal Drip      Allergies    shellfish (Anaphylaxis)  sulfamethoxazole (Unknown)    Intolerances        LABS:  Creatine Kinase, Serum in AM (17 @ 07:23)    Creatine Kinase, Serum: 127 U/L    CKMB Mass Assay (17 @ 07:23)    CKMB Units: 1.3 ng/mL    CPK Mass Assay %: 1.0 %    Troponin I, Serum (17 @ 07:23)    Troponin I, Serum: .005: The new reference range for Troponin-I performed on the Siemens EXL  system is 0.017-0.056 ng/mL which includes the 99th percentile of a  healthy reference population. Studies have shown that elevated troponin  levels above the cutoff are associated with an increased risk for adverse  cardiac events, with the risk increasing as troponin levels increase.  As  per a joint committee of the American College of Cardiology and   Society of Cardiology, diagnosis of classic MI is based upon the  detection of a rise or fall of cardiac troponin values, with at least one  value above the 99th percentile upper reference limit, in the appropriate  clinical context.  · Troponin I (ng/mL) Interpretation  · 0.017-0.056  Normal range (includes the 99th percentile of a healthy  reference population)  · >0.056  Elevated troponin level indicating increased risk  · Note: Troponin-I and Troponin T cannot be used interchangeably in  serial measurements.  Minimally elevated Troponin results should be  interpreted in the context of clinical findings and risk factors. ng/mL  Gamma Glutamyl Transferase, Serum (17 @ 11:18)    Gamma Glutamyl Transferase, Serum: 15 U/L  Alanine Aminotransferase (ALT/SGPT) (17 @ 07:23)    Alanine Aminotransferase (ALT/SGPT): 54 U/L DA    Aspartate Aminotransferase (AST/SGOT) (17 @ 07:23)    Aspartate Aminotransferase (AST/SGOT): 35 U/L                                12.6   12.4  )-----------( 278      ( 2017 07:23 ) [on steroids].             38.7         138  |  99  |  23  ----------------------------<  113<H>  4.2   |  27  |  1.14    Ca    9.4      2017 13:13    TPro  x   /  Alb  x   /  TBili  x   /  DBili  x   /  AST  35  /  ALT  54  /  AlkPhos  x   11-    PT/INR - ( 2017 13:13 )   PT: 12.6 sec;   INR: 1.15 ratio         PTT - ( 2017 13:13 )  PTT:28.5 sec  Urinalysis Basic - ( 2017 16:29 )    Color: Yellow / Appearance: Clear / S.015 / pH: x  Gluc: x / Ketone: Negative  / Bili: Negative / Urobili: Negative mg/dL   Blood: x / Protein: 100 mg/dL / Nitrite: Negative   Leuk Esterase: Trace / RBC: Negative /HPF / WBC 0-2   Sq Epi: x / Non Sq Epi: Occasional / Bacteria: Negative        RADIOLOGY & ADDITIONAL TESTS:    < from: US Transthoracic Echocardiogram w/Doppler Complete (17 @ 13:04) >  NTERPRETATION:  The patient is 5 feet 3 inches tall and weighs 150   pounds. The blood pressure is 155/76. Indication is for syncope.   Technician is Heineman.    The left atrial internal diameter is 5.0 cm. The aortic root diameters   3.0 cm. The left ventricular end-diastolic diameter is 5.3 cm. The septum   is 0.8 cm. The posterior wall was 0.7 cm. The estimated ejection fraction   was 60%. Next    The mitral valve apparatus demonstrates that the leaflets are thin and   the valve opens normally in diastole. The aortic valve demonstrates mild   calcification of its cusps with adequate opening of the valve in systole.   The left atrial internal diameter was enlarged. The aortic root diameter   was grossly normal. The right atrial and right ventricular internal   diameters were probably grossly normal. The left ventricular free wall   and interventricular septal thicknesses were normal. The left ventricular   internal diameters and contractility were grossly normal with an   estimated ejection fraction of approximately 60%.    On the color flow Doppler portion of the examination 1+ aortic   insufficiency 1+ mitral insufficiency and trace tricuspid insufficiency   was noted. The pulmonary artery pressure was measured at 33 mmHg. The IVC   did not appear to be enlarged.    Impression: Grossly normal left ventricular size and function with an   estimated ejection fraction of 60%.Enlargement of the left atrium with   mild mitral insufficiency. Mild aortic insufficiency.    < end of copied text >    < from: US Duplex Carotid Arteries Complete, Bilateral (17 @ 10:41) >  INTERPRETATION:  HISTORY: A Fib, on A/C, syncope, s/p CVA    COMPARISON:None    Findings: There is mild bilateral intimal thickening. No significant   plaque identified.    Peak systolic velocities are as follows (in cm/sec):     RIGHT:    PROX CCA = 67 ;  DIST CCA = 39 ;  PROX ICA = 44 ; MID ICA = 44   ; DIST ICA = not visualized; ECA = 44     LEFT   :    PROX CCA =  66 ;  DIST CCA = 48 ;  PROX ICA = 43 ;  DIST ICA   = 40 ; ECA = 53     There is antegrade flow through both vertebral arteries.       IMPRESSION:   No visualized hemodynamically significant carotid artery   stenoses. Please note that the right distal ICA could not be evaluated.    < end of copied text >  < from: US Abdomen Complete (17 @ 10:41) >  INTERPRETATION:  CLINICAL INFORMATION: Chronic kidney disease and   elevated LFTs.    COMPARISON: None available.    TECHNIQUE: Sonography of the abdomen.     FINDINGS:    Liver: Within normal limits.    Bile ducts: Normal caliber. Common bile duct measures 5 mm.     Gallbladder: Layering sludge within the gallbladder. Gallbladder wall   thickness measures 3 mm.       Pancreas: Poorly visualized.    Spleen: 6.9 cm. Within normal limits.    Right kidney: 10.7 cm. No hydronephrosis. There is a lower pole cyst   which measures 4.1 x 4.2 x 4.5 cm.    Left kidney: 9.3 cm.  No hydronephrosis. There is a lower pole cyst which   measures 1.3 x 1.6 x 1.4cm    Ascites: None.    Aorta and IVC: Visualized portions are within normal limits.    IMPRESSION: Gallbladder sludge.    No biliary ductal dilatation.    Bilateral renal cysts.    < end of copied text >  < from: CT Head No Cont (11.05.17 @ 13:01) >  INTERPRETATION:  History: Weakness altered mental status.    Noncontrast head CT. Prior 10/16/2011.    Age-appropriate cerebral parenchymal volume loss mild chronic ischemic   white matter changes. No territorial infarct bleed extra-axial collection   or mass effect. Basal ganglia calcification. Partially empty sella. Clear   sinuses. Cranium intact. Status post bilateral cataract surgery.    Impression: No acute or focal brain pathology. Partially empty sella.      < end of copied text >

## 2017-11-06 NOTE — PROGRESS NOTE ADULT - SUBJECTIVE AND OBJECTIVE BOX
Patient is a 89y Female with a known history of :  Personal history of urinary infection (Z87.440)  Leukocytosis, unspecified type (D72.829)  Renal failure, chronic, stage 3 (moderate) (N18.3)  Abnormal liver enzymes (R74.8)  Uncomplicated asthma, unspecified asthma severity, unspecified whether persistent (J45.909)  Long term (current) use of antithrombotics/antiplatelets (Z79.02)  Chronic atrial fibrillation (I48.2)  Vasovagal syncope (R55)  Syncope, near (R55)    HPI:  · HPI Objective Statement: 89 y/o F presents to the ED BIB EMS from home for generalized weakness, lightheadedness and fall today. Pt's daughter states that pt is normally lucid and sharp and lives alone. Today, pt called daughter at about 1100, sounding weak and short of breath. Daughter drove over to pt's home and found her, fully dressed and with rollers in her hair, sitting, leaning over, at the kitchen table appearing weak. Pt explains that she had gotten ready in the morning, and was having a BM in the restroom when she suddenly felt lightheaded and fell off the toilet. She denies hitting her head but notes that she hit her L arm. Notes L arm and R leg pain. She got off the floor and made her way over the kitchen but felt too lightheaded and had to sit down, and called her daughter. Pt also notes that her tongue is hurting at the moment. Pt takes Xarelto 15 mg (hx of Afib), Multaq, Advair., Zoloft. Hx of asthma, had a flare up this week. Daughter notes that pt is having difficulty speaking. Daughter notes pt did not vomit since daughter got to the scene, unaware if she did prior. PMHx: asthma, multiple falls. PSHx: hip replacement, femur surgery, knee replacement, cataract (1 year ago).	  · Presenting Symptoms: lightheaded, generalized weakness	  · Negative Findings: no blurred vision	  · Timing: sudden onset	  · Duration: today	  · Context: unknown	  · Aggravating Factors: none	  · Relieving Factors: none	  H/o of UTI's, WBC 16k, no fever, or dysuria (05 Nov 2017 15:05)        MEDICATIONS  (STANDING):  dronedarone 400 milliGRAM(s) Oral two times a day  fluticasone propionate/ salmeterol 500-50 MICROgram(s) Diskus 1 Dose(s) Inhalation two times a day  sertraline 50 milliGRAM(s) Oral daily    MEDICATIONS  (PRN):  acetaminophen   Tablet 650 milliGRAM(s) Oral every 6 hours PRN pain  loratadine 10 milliGRAM(s) Oral daily PRN nasal drip      ALLERGIES: shellfish (Anaphylaxis)  sulfamethoxazole (Unknown)      FAMILY HISTORY:  Family history of pancreatic cancer (Sibling)  Family history of pneumonia (Mother)  Family history of Hodgkin's disease (Father)      Social history:  Alochol:   Smoking:   Drug Use:   Marital Status:     PHYSICAL EXAMINATION:  -----------------------------  T(C): 36.7 (11-06-17 @ 05:45), Max: 37.1 (11-06-17 @ 00:24)  HR: 80 (11-06-17 @ 05:45) (69 - 80)  BP: 155/76 (11-06-17 @ 05:45) (120/64 - 160/78)  RR: 18 (11-06-17 @ 05:45) (18 - 18)  SpO2: 95% (11-06-17 @ 05:45) (95% - 99%)  Wt(kg): --    Height (cm): 160.02 (11-05 @ 15:05)  Weight (kg): 68 (11-05 @ 15:05)  BMI (kg/m2): 26.6 (11-05 @ 15:05)  BSA (m2): 1.71 (11-05 @ 15:05)    Constitutional: well developed, normal appearance, well groomed, well nourished, no deformities and no acute distress.   Eyes: the conjunctiva exhibited no abnormalities and the eyelids demonstrated no xanthelasmas.   HEENT: normal oral mucosa, no oral pallor and no oral cyanosis.   Neck: normal jugular venous A waves present, normal jugular venous V waves present and no jugular venous monroe A waves.   Pulmonary: no respiratory distress, normal respiratory rhythm and effort, no accessory muscle use and lungs were clear to auscultation bilaterally.   Cardiovascular: heart rate and rhythm were irregularl, normal S1 and S2 and no murmur, gallop, rub, heave or thrill are present.   Musculoskeletal: the gait could not be assessed..   Extremities: no clubbing of the fingernails, no localized cyanosis, no petechial hemorrhages and no ischemic changes.   Skin: normal skin color and pigmentation, no rash, no venous stasis, no skin lesions, no skin ulcer and no xanthoma was observed.   Psychiatric: oriented to person, place, and time, the affect was normal, the mood was normal and not feeling anxious.     LABS:   --------  CBC Full  -  ( 06 Nov 2017 07:23 )  WBC Count : 12.4 K/uL  Hemoglobin : 12.6 g/dL  Hematocrit : 38.7 %  Platelet Count - Automated : 278 K/uL  Mean Cell Volume : 86.3 fl  Mean Cell Hemoglobin : 28.0 pg  Mean Cell Hemoglobin Concentration : 32.4 gm/dL  Auto Neutrophil # : 8.8 K/uL  Auto Lymphocyte # : 2.5 K/uL  Auto Monocyte # : 0.9 K/uL  Auto Eosinophil # : 0.1 K/uL  Auto Basophil # : 0.1 K/uL  Auto Neutrophil % : 71.1 %  Auto Lymphocyte % : 19.9 %  Auto Monocyte % : 07.4 %  Auto Eosinophil % : 1.0 %  Auto Basophil % : 0.7 %      11-05    138  |  99  |  23  ----------------------------<  113<H>  4.2   |  27  |  1.14    Ca    9.4      05 Nov 2017 13:13    TPro  7.5  /  Alb  3.6  /  TBili  0.7  /  DBili  x   /  AST  72<H>  /  ALT  75<H>  /  AlkPhos  97  11-05       PT/INR - ( 05 Nov 2017 13:13 )   PT: 12.6 sec;   INR: 1.15 ratio         PTT - ( 05 Nov 2017 13:13 )  PTT:28.5 sec    11-05 @ 20:36 CPK total:--, CKMB --, Troponin I - .005 ng/mL<L>  11-05 @ 13:32 CPK total:--, CKMB --, Troponin I - .002 ng/mL<L>    11/6/2017:  On Tele = A Fib good ventricular response.  Echo PRELIM: = normal EF and normal wall motion.  Mild AI  and mild MR.    Radiology:

## 2017-11-06 NOTE — CONSULT NOTE ADULT - ASSESSMENT
fall lightheadedness suspect presyncopal event possible vasovagal along with possible painic attack  no signs to suggest cva or clear h/o of seizures  tele eval  monitor sbp  echo  cd  spoke to daughter   neurology wise stable

## 2017-11-06 NOTE — DISCHARGE NOTE ADULT - HOSPITAL COURSE
· HPI Objective Statement: 89 y/o F presents to the ED BIB EMS from home for generalized weakness, lightheadedness and fall today. Pt's daughter states that pt is normally lucid and sharp and lives alone. Today, pt called daughter at about 1100, sounding weak and short of breath. Daughter drove over to pt's home and found her, fully dressed and with rollers in her hair, sitting, leaning over, at the kitchen table appearing weak. Pt explains that she had gotten ready in the morning, and was having a BM in the restroom when she suddenly felt lightheaded and fell off the toilet. She denies hitting her head but notes that she hit her L arm. Notes L arm and R leg pain. She got off the floor and made her way over the kitchen but felt too lightheaded and had to sit down, and called her daughter. Pt also notes that her tongue is hurting at the moment. Pt takes Xarelto 15 mg (hx of Afib), Multaq, Advair., Zoloft. Hx of asthma, had a flare up this week. Daughter notes that pt is having difficulty speaking. Daughter notes pt did not vomit since daughter got to the scene, unaware if she did prior. PMHx: asthma, multiple falls. PSHx: hip replacement, femur surgery, knee replacement, cataract (1 year ago).	  · Presenting Symptoms: lightheaded, generalized weakness	  Admission cardio-neuro monitoring, consult, carotid US & TTE no significant pathology, ^ LFT, WBC improved on f/u on weaning off steroids x asthma, U/A [-], nl abdominal US & GGTP, nl renal US, ^ eGFR.  Discharge home, dx vasovagal event, /c significant 20 point difference in SBP orthostatic changes.

## 2017-11-06 NOTE — PHYSICAL THERAPY INITIAL EVALUATION ADULT - GENERAL OBSERVATIONS, REHAB EVAL
Pt received supine in bed. All lines intact. + telemetry + IV heplock. Pt agreeable to physical therapy.

## 2017-11-06 NOTE — PHYSICAL THERAPY INITIAL EVALUATION ADULT - PRECAUTIONS/LIMITATIONS, REHAB EVAL
2Lo2NC prn for sob, cp, tachy, dizzy, weakness. HOB elevated 30 degrees/oxygen therapy device and L/min/fall precautions

## 2017-11-06 NOTE — PHYSICAL THERAPY INITIAL EVALUATION ADULT - ADDITIONAL COMMENTS
Pt lives in private ranch home with 3 MEGGAN +HR. Pt owns a rw (which she does not use) and sac (which she uses on the outside)

## 2017-11-06 NOTE — DISCHARGE NOTE ADULT - NS AS ACTIVITY OBS
Walking-Outdoors allowed/Do not make important decisions/Do not drive or operate machinery/Showering allowed/Walking-Indoors allowed/No Heavy lifting/straining

## 2017-11-06 NOTE — DISCHARGE NOTE ADULT - SECONDARY DIAGNOSIS.
Chronic atrial fibrillation Long term (current) use of antithrombotics/antiplatelets Uncomplicated asthma, unspecified asthma severity, unspecified whether persistent Abnormal liver enzymes Renal failure, chronic, stage 3 (moderate) Presbycusis of both ears

## 2017-11-06 NOTE — DISCHARGE NOTE ADULT - MEDICATION SUMMARY - MEDICATIONS TO TAKE
I will START or STAY ON the medications listed below when I get home from the hospital:    Multaq 400 mg oral tablet  -- 1 tab(s) by mouth 2 times a day  -- Indication: For Chronic atrial fibrillation    Xarelto 15 mg oral tablet  -- tab(s) by mouth once a day (in the evening)  -- Indication: For Chronic atrial fibrillation    Zoloft 50 mg oral tablet  -- 1 tab(s) by mouth once a day (at bedtime)  -- Indication: For Depression    loratadine 10 mg oral tablet  -- 1 tab(s) by mouth once a day  -- Indication: For Asthma    albuterol  -- Indication: For Asthma    Advair Diskus 500 mcg-50 mcg inhalation powder  -- 1 puff(s) inhaled 2 times a day  -- Indication: For Asthma

## 2017-11-06 NOTE — PHYSICAL THERAPY INITIAL EVALUATION ADULT - PERTINENT HX OF CURRENT PROBLEM, REHAB EVAL
89 y/o F presents to the ED BIB EMS from home for generalized weakness, lightheadedness and fall today. Pt's daughter states that pt is normally lucid and sharp and lives alone.

## 2017-11-06 NOTE — PROGRESS NOTE ADULT - ASSESSMENT
89 year old female, chronic A Fib, with probable Vaso Vagal syncope.  No acute Cardiac event.  Grossly  normal Echo for her age.  Tele = chronic A Fib with good ventricular response.

## 2017-11-07 VITALS
HEART RATE: 77 BPM | SYSTOLIC BLOOD PRESSURE: 135 MMHG | OXYGEN SATURATION: 100 % | TEMPERATURE: 98 F | RESPIRATION RATE: 18 BRPM | DIASTOLIC BLOOD PRESSURE: 64 MMHG

## 2017-11-07 RX ADMIN — FLUTICASONE PROPIONATE AND SALMETEROL 1 DOSE(S): 50; 250 POWDER ORAL; RESPIRATORY (INHALATION) at 06:43

## 2017-11-07 RX ADMIN — DRONEDARONE 400 MILLIGRAM(S): 400 TABLET, FILM COATED ORAL at 06:26

## 2017-11-07 RX ADMIN — Medication 10 MILLIGRAM(S): at 12:07

## 2017-11-07 NOTE — PROGRESS NOTE ADULT - SUBJECTIVE AND OBJECTIVE BOX
Patient is a 89y Female with a known history of :  Atrial fibrillation (I48.91)  Personal history of urinary infection (Z87.440)  Leukocytosis, unspecified type (D72.829)  Renal failure, chronic, stage 3 (moderate) (N18.3)  Abnormal liver enzymes (R74.8)  Uncomplicated asthma, unspecified asthma severity, unspecified whether persistent (J45.909)  Long term (current) use of antithrombotics/antiplatelets (Z79.02)  Chronic atrial fibrillation (I48.2)  Vasovagal syncope (R55)  Syncope, near (R55)    HPI:  · HPI Objective Statement: 89 y/o F presents to the ED BIB EMS from home for generalized weakness, lightheadedness and fall today. Pt's daughter states that pt is normally lucid and sharp and lives alone. Today, pt called daughter at about 1100, sounding weak and short of breath. Daughter drove over to pt's home and found her, fully dressed and with rollers in her hair, sitting, leaning over, at the kitchen table appearing weak. Pt explains that she had gotten ready in the morning, and was having a BM in the restroom when she suddenly felt lightheaded and fell off the toilet. She denies hitting her head but notes that she hit her L arm. Notes L arm and R leg pain. She got off the floor and made her way over the kitchen but felt too lightheaded and had to sit down, and called her daughter. Pt also notes that her tongue is hurting at the moment. Pt takes Xarelto 15 mg (hx of Afib), Multaq, Advair., Zoloft. Hx of asthma, had a flare up this week. Daughter notes that pt is having difficulty speaking. Daughter notes pt did not vomit since daughter got to the scene, unaware if she did prior. PMHx: asthma, multiple falls. PSHx: hip replacement, femur surgery, knee replacement, cataract (1 year ago).	  · Presenting Symptoms: lightheaded, generalized weakness	  · Negative Findings: no blurred vision	  · Timing: sudden onset	  · Duration: today	  · Context: unknown	  · Aggravating Factors: none	  · Relieving Factors: none	  H/o of UTI's, WBC 16k, no fever, or dysuria (05 Nov 2017 15:05)      REVIEW OF SYSTEMS:    CONSTITUTIONAL: No fever, weight loss, or fatigue  EYES: No eye pain, visual disturbances, or discharge  ENMT:  No difficulty hearing, tinnitus, vertigo; No sinus or throat pain  NECK: No pain or stiffness  BREASTS: No pain, masses, or nipple discharge  RESPIRATORY: No cough, wheezing, chills or hemoptysis; No shortness of breath  CARDIOVASCULAR: No chest pain, palpitations, dizziness, or leg swelling  GASTROINTESTINAL: No abdominal or epigastric pain. No nausea, vomiting, or hematemesis; No diarrhea or constipation. No melena or hematochezia.  GENITOURINARY: No dysuria, frequency, hematuria, or incontinence  NEUROLOGICAL: No headaches, memory loss, loss of strength, numbness, or tremors  SKIN: No itching, burning, rashes, or lesions   LYMPH NODES: No enlarged glands  ENDOCRINE: No heat or cold intolerance; No hair loss  MUSCULOSKELETAL: No joint pain or swelling; No muscle, back, or extremity pain  PSYCHIATRIC: No depression, anxiety, mood swings, or difficulty sleeping  HEME/LYMPH: No easy bruising, or bleeding gums  ALLERGY AND IMMUNOLOGIC: No hives or eczema    MEDICATIONS  (STANDING):  dronedarone 400 milliGRAM(s) Oral <User Schedule>  fluticasone propionate/ salmeterol 500-50 MICROgram(s) Diskus 1 Dose(s) Inhalation two times a day  rivaroxaban 15 milliGRAM(s) Oral <User Schedule>  sertraline 50 milliGRAM(s) Oral at bedtime    MEDICATIONS  (PRN):  acetaminophen   Tablet 650 milliGRAM(s) Oral every 6 hours PRN Pain  loratadine 10 milliGRAM(s) Oral daily PRN Nasal Drip      ALLERGIES: shellfish (Anaphylaxis)  sulfamethoxazole (Unknown)      FAMILY HISTORY:  Family history of pancreatic cancer (Sibling)  Family history of pneumonia (Mother)  Family history of Hodgkin's disease (Father)      Social history:  Alochol:   Smoking:   Drug Use:   Marital Status:     PHYSICAL EXAMINATION:  -----------------------------  T(C): 36.6 (11-07-17 @ 05:15), Max: 37.3 (11-06-17 @ 21:02)  HR: 80 (11-07-17 @ 05:15) (77 - 80)  BP: 136/64 (11-07-17 @ 00:24) (112/65 - 136/64)  RR: 18 (11-07-17 @ 05:15) (17 - 18)  SpO2: 96% (11-07-17 @ 05:15) (93% - 98%)  Wt(kg): --    11-06 @ 07:01  -  11-07 @ 07:00  --------------------------------------------------------  IN:    Oral Fluid: 300 mL  Total IN: 300 mL    OUT:    Voided: 1 mL  Total OUT: 1 mL    Total NET: 299 mL            Constitutional: well developed, normal appearance, well groomed, well nourished, no deformities and no acute distress.   Eyes: the conjunctiva exhibited no abnormalities and the eyelids demonstrated no xanthelasmas.   HEENT: normal oral mucosa, no oral pallor and no oral cyanosis.   Neck: normal jugular venous A waves present, normal jugular venous V waves present and no jugular venous monroe A waves.   Pulmonary: no respiratory distress, normal respiratory rhythm and effort, no accessory muscle use and lungs were clear to auscultation bilaterally.   Cardiovascular: heart rate and rhythm were normal, normal S1 and S2 and no murmur, gallop, rub, heave or thrill are present.   Musculoskeletal: the gait could not be assessed.  Extremities: no clubbing of the fingernails, no localized cyanosis, no petechial hemorrhages and no ischemic changes.   Skin: normal skin color and pigmentation, no rash, no venous stasis, no skin lesions, no skin ulcer and no xanthoma was observed.   Psychiatric: oriented to person, place, and time, the affect was normal, the mood was normal and not feeling anxious.     LABS:   --------  11-05    138  |  99  |  23  ----------------------------<  113<H>  4.2   |  27  |  1.14    Ca    9.4      05 Nov 2017 13:13    TPro  x   /  Alb  x   /  TBili  x   /  DBili  x   /  AST  35  /  ALT  54  /  AlkPhos  x   11-06                         12.6   12.4  )-----------( 278      ( 06 Nov 2017 07:23 )             38.7     PT/INR - ( 05 Nov 2017 13:13 )   PT: 12.6 sec;   INR: 1.15 ratio         PTT - ( 05 Nov 2017 13:13 )  PTT:28.5 sec    11-06 @ 07:23 CPK total:--, CKMB --, Troponin I - .005 ng/mL<L>  11-05 @ 20:36 CPK total:--, CKMB --, Troponin I - .005 ng/mL<L>  11-05 @ 13:32 CPK total:--, CKMB --, Troponin I - .002 ng/mL<L>      Culture Results:   No growth (11-05 @ 21:23)    11-05 @ 21:23    Organism --   Gram Stain Blood -- Gram Stain --  Specimen Source .Urine Clean Catch (Midstream)  Culture-Blood --        Radiology:    < from: US Transthoracic Echocardiogram w/Doppler Complete (11.06.17 @ 13:04) >    EXAM:  US TTE W DOPPLER COMPLETE                                  PROCEDURE DATE:  11/06/2017          INTERPRETATION:  The patient is 5 feet 3 inches tall and weighs 150   pounds. The blood pressure is 155/76. Indication is for syncope.   Technician is Heineman.    The left atrial internal diameter is 5.0 cm. The aortic root diameters   3.0 cm. The left ventricular end-diastolic diameter is 5.3 cm. The septum   is 0.8 cm. The posterior wall was 0.7 cm. The estimated ejection fraction   was 60%. Next    The mitral valve apparatus demonstrates that the leaflets are thin and   the valve opens normally in diastole. The aortic valve demonstrates mild   calcification of its cusps with adequate opening of the valve in systole.   The left atrial internal diameter was enlarged. The aortic root diameter   was grossly normal. The right atrial and right ventricular internal   diameters were probably grossly normal. The left ventricular free wall   and interventricular septal thicknesses were normal. The left ventricular   internal diameters and contractility were grossly normal with an   estimated ejection fraction of approximately 60%.    On the color flow Doppler portion of the examination 1+ aortic   insufficiency 1+ mitral insufficiency and trace tricuspid insufficiency   was noted. The pulmonary artery pressure was measured at 33 mmHg. The IVC   did not appear to be enlarged.    Impression: Grossly normal left ventricular size and function with an   estimated ejection fraction of 60%.Enlargement of the left atrium with   mild mitral insufficiency. Mild aortic insufficiency.                  ANAYELI JALLOH M.D., ATTENDING CARDIOLOGIST  This document has been electronically signed. Nov 6 2017  1:28PM                < end of copied text >    < from: US Abdomen Complete (11.06.17 @ 10:41) >    EXAM:  US ABDOMEN COMPLETE                                  PROCEDURE DATE:  11/06/2017          INTERPRETATION:  CLINICAL INFORMATION: Chronic kidney disease and   elevated LFTs.    COMPARISON: None available.    TECHNIQUE: Sonography of the abdomen.     FINDINGS:    Liver: Within normal limits.    Bile ducts: Normal caliber. Common bile duct measures 5 mm.     Gallbladder: Layering sludge within the gallbladder. Gallbladder wall   thickness measures 3 mm.       Pancreas: Poorly visualized.    Spleen: 6.9 cm. Within normal limits.    Right kidney: 10.7 cm. No hydronephrosis. There is a lower pole cyst   which measures 4.1 x 4.2 x 4.5 cm.    Left kidney: 9.3 cm.  No hydronephrosis. There is a lower pole cyst which   measures 1.3 x 1.6 x 1.4cm    Ascites: None.    Aorta and IVC: Visualized portions are within normal limits.    IMPRESSION: Gallbladder sludge.    No biliary ductal dilatation.    Bilateral renal cysts.                  ROBERTO PINTO M.D., ATTENDING RADIOLOGIST  This document has been electronically signed. Nov 6 2017 11:05AM                < end of copied text >

## 2017-11-07 NOTE — PROGRESS NOTE ADULT - ASSESSMENT
89y/o w/f seen at [facility].  Daughter and Son-in-law at bedside.  History A-Fib (chronic for at least 1 year), COPD-asthma, reform smoker, falls  S/P B/L hip surgery.  S/P left knee surgery.  S/P B/L cataract surgery.   About 3 years ago Dr. Echevarria did Cardiac catheterization without intervention at [facility].    Admitted today patient felt lightheaded and then had gone to the bathroom where she became more lightheaded and weak.   To the point that she had to slide off the toilet.  No other associated symptoms.    11/7/17   Patient sitting in chair.  No complaints.  "I feel OK."    Impression:    As above, lightheaded and weak.    Possible vaso-vagal syncope.    Plan:  - Continue present therapy.  - Orthostatics.  - Risks and benefits of Xarelto fully discussed.  - Patient and family will discuss with their Cardiologist about Multaq.  - Seen by Neurology.  - Patient stable from a Cardiology stand-point and to follow-up with her private Cardiologist.

## 2017-11-07 NOTE — PROGRESS NOTE ADULT - ASSESSMENT
Assessment and Recommendation:   · Assessment		  fall lightheadedness suspect presyncopal event possible vasovagal along with possible painic attack  no signs to suggest cva or clear h/o of seizures  tele eval  monitor sbp  echo  cd  spoke to daughter   neurology wise stable     · Assessment		  89 year old female, chronic A Fib, with probable Vaso Vagal syncope.  No acute Cardiac event.  Grossly  normal Echo for her age.  Tele = chronic A Fib with good ventricular response.    Problem/Plan - 1:  ·  Problem: Syncope, near.  Plan: Probably vaso Vagal.     Problem/Plan - 2:  ·  Problem: Atrial fibrillation.  Plan: Good Ventricular response on Dronedarone.       Clinical Impressions:   · Criteria for Skilled Therapeutic Interventions	impairments found; functional limitations in following categories	  · Impairments Found (describe specific impairments)	gait, locomotion, and balance	  · Functional Limitations in Following Categories (describe specific limitations)	home management	  · Rehab Potential	good, to achieve stated therapy goals	  · Therapy Frequency	3-5x/week	  · Predicted Duration of Therapy Intervention (days/wks)	3-5 days	  · Anticipated Discharge Recommendations	home w/ home PT	    General Interventions:   · Planned Therapy Interventions	gait training; bed mobility training; balance training; transfer training	  · Bed Mobility Training	supine to sit independently in 3-5 days	  · Transfer Training	sit to stand independently in 3-5 days	  · Gait Training	ambulate independently with AAD if needed for 150 ft in 3-5 days	  · Balance Training	improve balance in all areas 1/2 grade in 3-5 days	      No evidence of intrinsic hepatic, renal or vascular disorder. ^ WBC on steroids & post syncopal event, likely reactive leukemoid rx.  s/p Asthma exacerbation on INH & tapering off steroids.  On chronic A/C x non-valvular atrial fibrillation.

## 2017-11-07 NOTE — PROGRESS NOTE ADULT - SUBJECTIVE AND OBJECTIVE BOX
INTERVAL HPI/OVERNIGHT EVENTS:  Other than left rib cage pain s/p fall contusion she feels better    HEALTH ISSUES - PROBLEM Dx:  Atrial fibrillation: Atrial fibrillation  Personal history of urinary infection: Personal history of urinary infection  Leukocytosis, unspecified type: Leukocytosis, unspecified type  Renal failure, chronic, stage 3 (moderate): Renal failure, chronic, stage 3 (moderate)  Abnormal liver enzymes: Abnormal liver enzymes  Uncomplicated asthma, unspecified asthma severity, unspecified whether persistent: Uncomplicated asthma, unspecified asthma severity, unspecified whether persistent  Long term (current) use of antithrombotics/antiplatelets: Long term (current) use of antithrombotics/antiplatelets  Chronic atrial fibrillation: Chronic atrial fibrillation  Vasovagal syncope: Vasovagal syncope  Syncope, near: Syncope, near      PAST MEDICAL & SURGICAL HISTORY:  History of hip replacement, total, right  Presbycusis of both ears: hearing aid  Macular degeneration (senile) of retina  Atrial fibrillation  Asthma  Status post cataract surgery, right  Status post cataract surgery, left  History of hip fracture: left hip, additional femoral rad for distal fx.  History of knee replacement, total, left  History of hip replacement, total, right     @ 07:01  -   @ 07:00  --------------------------------------------------------  IN: 300 mL / OUT: 1 mL / NET: 299 mL        VITAL SIGNS:  Vital Signs Last 24 Hrs  T(C): 36.7 (2017 09:22), Max: 37.3 (2017 21:02)  T(F): 98.1 (2017 09:22), Max: 99.1 (2017 21:02)  HR: 67 (2017 09:22) (67 - 80)  BP: 150/69 (2017 09:22) (112/65 - 150/69)  BP(mean): --  RR: 16 (2017 09:22) (16 - 18)  SpO2: 96% (2017 09:22) (93% - 98%)  PHYSICAL EXAM:    Constitutional: NAD, oob to chair, alert & o, [place].  Eyes: slight pale, nl vision  ENMT: no central facial weakness, hydration, slight dry.  Neck: supple, no bruit  Respiratory: unlabored, symmetric, /s adventitious sounds.  Cardiovascular: MVR, s1s2, no significant heart murmur  Gastrointestinal: soft, nontender, no distention, bs +  Extremities: no edema, cellulitis, phlebitis. .  Vascular: Normal peripheral pulses  Neurological: no gross deficit, no asymmetry.  Musculoskeletal: from, left rib cage tenderness, x-ray old 7th & ? 8th rib fx.  Skin: Nl integrity & turgor. No exanthema or stigmata.    MEDICATIONS  (STANDING):  dronedarone 400 milliGRAM(s) Oral <User Schedule>  fluticasone propionate/ salmeterol 500-50 MICROgram(s) Diskus 1 Dose(s) Inhalation two times a day  rivaroxaban 15 milliGRAM(s) Oral <User Schedule>  sertraline 50 milliGRAM(s) Oral at bedtime    MEDICATIONS  (PRN):  acetaminophen   Tablet 650 milliGRAM(s) Oral every 6 hours PRN Pain  loratadine 10 milliGRAM(s) Oral daily PRN Nasal Drip      Allergies    shellfish (Anaphylaxis)  sulfamethoxazole (Unknown)    Intolerances        CAPILLARY BLOOD GLUCOSE      LABS:                        12.6   12.4  )-----------( 278      ( 2017 07:23 )             38.7     11-05    138  |  99  |  23  ----------------------------<  113<H>  4.2   |  27  |  1.14    Ca    9.4      2017 13:13    TPro  x   /  Alb  x   /  TBili  x   /  DBili  x   /  AST  35  /  ALT  54  /  AlkPhos  x   11-06    PT/INR - ( 2017 13:13 )   PT: 12.6 sec;   INR: 1.15 ratio         PTT - ( 2017 13:13 )  PTT:28.5 sec  Urinalysis Basic - ( 2017 16:29 )    Color: Yellow / Appearance: Clear / S.015 / pH: x  Gluc: x / Ketone: Negative  / Bili: Negative / Urobili: Negative mg/dL   Blood: x / Protein: 100 mg/dL / Nitrite: Negative   Leuk Esterase: Trace / RBC: Negative /HPF / WBC 0-2   Sq Epi: x / Non Sq Epi: Occasional / Bacteria: Negative        RADIOLOGY & ADDITIONAL TESTS:      < from: Xray Ribs 2 Views, Left (17 @ 14:23) >    PROCEDURE DATE:  2017          INTERPRETATION:  Impression: Combined report    < from: Xray Chest 1 View AP/PA (17 @ 14:22) >  INTERPRETATION:  Chest pain.    Frontal chest, 3 views left ribs.    Mild cardiomegaly with atherosclerotic aorta. No consolidation   pneumothorax or effusion. Old fractures left seventh and eighth ribs. No   definite acute displaced fracture. Thoracolumbar scoliosis.    Impression: As above    < from: US Transthoracic Echocardiogram w/Doppler Complete (17 @ 13:04) >  INTERPRETATION:  The patient is 5 feet 3 inches tall and weighs 150   pounds. The blood pressure is 155/76. Indication is for syncope.   Technician is Heineman.    The left atrial internal diameter is 5.0 cm. The aortic root diameters   3.0 cm. The left ventricular end-diastolic diameter is 5.3 cm. The septum   is 0.8 cm. The posterior wall was 0.7 cm. The estimated ejection fraction   was 60%. Next    The mitral valve apparatus demonstrates that the leaflets are thin and   the valve opens normally in diastole. The aortic valve demonstrates mild   calcification of its cusps with adequate opening of the valve in systole.   The left atrial internal diameter was enlarged. The aortic root diameter   was grossly normal. The right atrial and right ventricular internal   diameters were probably grossly normal. The left ventricular free wall   and interventricular septal thicknesses were normal. The left ventricular   internal diameters and contractility were grossly normal with an   estimated ejection fraction of approximately 60%.    On the color flow Doppler portion of the examination 1+ aortic   insufficiency 1+ mitral insufficiency and trace tricuspid insufficiency   was noted. The pulmonary artery pressure was measured at 33 mmHg. The IVC   did not appear to be enlarged.    Impression: Grossly normal left ventricular size and function with an   estimated ejection fraction of 60%.Enlargement of the left atrium with   mild mitral insufficiency. Mild aortic insufficiency.        < end of copied text > INTERVAL HPI/OVERNIGHT EVENTS:  Other than left rib cage pain s/p fall contusion she feels better    HEALTH ISSUES - PROBLEM Dx:  Atrial fibrillation: Atrial fibrillation  Personal history of urinary infection: Personal history of urinary infection  Leukocytosis, unspecified type: Leukocytosis, unspecified type  Renal failure, chronic, stage 3 (moderate): Renal failure, chronic, stage 3 (moderate)  Abnormal liver enzymes: Abnormal liver enzymes  Uncomplicated asthma, unspecified asthma severity, unspecified whether persistent: Uncomplicated asthma, unspecified asthma severity, unspecified whether persistent  Long term (current) use of antithrombotics/antiplatelets: Long term (current) use of antithrombotics/antiplatelets  Chronic atrial fibrillation: Chronic atrial fibrillation  Vasovagal syncope: Vasovagal syncope  Syncope, near: Syncope, near      PAST MEDICAL & SURGICAL HISTORY:  History of hip replacement, total, right  Presbycusis of both ears: hearing aid  Macular degeneration (senile) of retina  Atrial fibrillation  Asthma  Status post cataract surgery, right  Status post cataract surgery, left  History of hip fracture: left hip, additional femoral rad for distal fx.  History of knee replacement, total, left  History of hip replacement, total, right     @ 07:01  -   @ 07:00  --------------------------------------------------------  IN: 300 mL / OUT: 1 mL / NET: 299 mL        VITAL SIGNS:  Vital Signs Last 24 Hrs  T(C): 36.7 (2017 09:22), Max: 37.3 (2017 21:02)  T(F): 98.1 (2017 09:22), Max: 99.1 (2017 21:02)  HR: 67 (2017 09:22) (67 - 80)  BP: 150/69 (2017 09:22) (112/65 - 150/69)  BP(mean): --  RR: 16 (2017 09:22) (16 - 18)  SpO2: 96% (2017 09:22) (93% - 98%)  PHYSICAL EXAM:    Constitutional: NAD, oob to chair, alert & o, [place].  Eyes: slight pale, nl vision  ENMT: no central facial weakness, hydration, slight dry.  Neck: supple, no bruit  Respiratory: unlabored, symmetric, /s adventitious sounds.  Cardiovascular: MVR, s1s2, no significant heart murmur  Gastrointestinal: soft, nontender, no distention, bs +  Extremities: no edema, cellulitis, phlebitis. .  Vascular: Normal peripheral pulses  Neurological: no gross deficit, no asymmetry.  Musculoskeletal: from, left rib cage tenderness, x-ray old 7th & ? 8th rib fx.  Skin: Nl integrity & turgor. No exanthema or stigmata.    TELEMETRY No events, no change, chronic At Fib, MVR, no significant tachy/ aleksander/ pause/ ectopics.      MEDICATIONS  (STANDING):  dronedarone 400 milliGRAM(s) Oral <User Schedule>  fluticasone propionate/ salmeterol 500-50 MICROgram(s) Diskus 1 Dose(s) Inhalation two times a day  rivaroxaban 15 milliGRAM(s) Oral <User Schedule>  sertraline 50 milliGRAM(s) Oral at bedtime    MEDICATIONS  (PRN):  acetaminophen   Tablet 650 milliGRAM(s) Oral every 6 hours PRN Pain  loratadine 10 milliGRAM(s) Oral daily PRN Nasal Drip      Allergies    shellfish (Anaphylaxis)  sulfamethoxazole (Unknown)    Intolerances        CAPILLARY BLOOD GLUCOSE      LABS:                        12.6   12.4  )-----------( 278      ( 2017 07:23 )             38.7     11-05    138  |  99  |  23  ----------------------------<  113<H>  4.2   |  27  |  1.14    Ca    9.4      2017 13:13    TPro  x   /  Alb  x   /  TBili  x   /  DBili  x   /  AST  35  /  ALT  54  /  AlkPhos  x   11-06    PT/INR - ( 2017 13:13 )   PT: 12.6 sec;   INR: 1.15 ratio         PTT - ( 2017 13:13 )  PTT:28.5 sec  Urinalysis Basic - ( 2017 16:29 )    Color: Yellow / Appearance: Clear / S.015 / pH: x  Gluc: x / Ketone: Negative  / Bili: Negative / Urobili: Negative mg/dL   Blood: x / Protein: 100 mg/dL / Nitrite: Negative   Leuk Esterase: Trace / RBC: Negative /HPF / WBC 0-2   Sq Epi: x / Non Sq Epi: Occasional / Bacteria: Negative        RADIOLOGY & ADDITIONAL TESTS:      < from: Xray Ribs 2 Views, Left (17 @ 14:23) >    PROCEDURE DATE:  2017          INTERPRETATION:  Impression: Combined report    < from: Xray Chest 1 View AP/PA (17 @ 14:22) >  INTERPRETATION:  Chest pain.    Frontal chest, 3 views left ribs.    Mild cardiomegaly with atherosclerotic aorta. No consolidation   pneumothorax or effusion. Old fractures left seventh and eighth ribs. No   definite acute displaced fracture. Thoracolumbar scoliosis.    Impression: As above    < from: US Transthoracic Echocardiogram w/Doppler Complete (17 @ 13:04) >  INTERPRETATION:  The patient is 5 feet 3 inches tall and weighs 150   pounds. The blood pressure is 155/76. Indication is for syncope.   Technician is Heineman.    The left atrial internal diameter is 5.0 cm. The aortic root diameters   3.0 cm. The left ventricular end-diastolic diameter is 5.3 cm. The septum   is 0.8 cm. The posterior wall was 0.7 cm. The estimated ejection fraction   was 60%. Next    The mitral valve apparatus demonstrates that the leaflets are thin and   the valve opens normally in diastole. The aortic valve demonstrates mild   calcification of its cusps with adequate opening of the valve in systole.   The left atrial internal diameter was enlarged. The aortic root diameter   was grossly normal. The right atrial and right ventricular internal   diameters were probably grossly normal. The left ventricular free wall   and interventricular septal thicknesses were normal. The left ventricular   internal diameters and contractility were grossly normal with an   estimated ejection fraction of approximately 60%.    On the color flow Doppler portion of the examination 1+ aortic   insufficiency 1+ mitral insufficiency and trace tricuspid insufficiency   was noted. The pulmonary artery pressure was measured at 33 mmHg. The IVC   did not appear to be enlarged.    Impression: Grossly normal left ventricular size and function with an   estimated ejection fraction of 60%.Enlargement of the left atrium with   mild mitral insufficiency. Mild aortic insufficiency.        < end of copied text >

## 2017-11-07 NOTE — PROGRESS NOTE ADULT - PROBLEM SELECTOR PROBLEM 5
Uncomplicated asthma, unspecified asthma severity, unspecified whether persistent
Uncomplicated asthma, unspecified asthma severity, unspecified whether persistent

## 2017-11-07 NOTE — PROGRESS NOTE ADULT - PROBLEM SELECTOR PLAN 7
f/u, limit nephrotoxicity, hydration, renal/ bladder US
f/u, limit nephrotoxicity, hydration, renal/ bladder US

## 2017-11-07 NOTE — PROGRESS NOTE ADULT - PROBLEM SELECTOR PROBLEM 4
Long term (current) use of antithrombotics/antiplatelets
Long term (current) use of antithrombotics/antiplatelets

## 2017-11-07 NOTE — PROGRESS NOTE ADULT - NSHPATTENDINGPLANDISCUSS_GEN_ALL_CORE
Pt, daughter yesterday, cardio, nurse.
Pt, daughter @ bedside, neuro & cardio as well as pT consult & floor nurse.

## 2017-11-07 NOTE — PROGRESS NOTE ADULT - SUBJECTIVE AND OBJECTIVE BOX
Neurology follow up note    JT [id number]      Interval History:    Patient feels ok no new complaints.    MEDICATIONS    acetaminophen   Tablet 650 milliGRAM(s) Oral every 6 hours PRN  dronedarone 400 milliGRAM(s) Oral <User Schedule>  fluticasone propionate/ salmeterol 500-50 MICROgram(s) Diskus 1 Dose(s) Inhalation two times a day  loratadine 10 milliGRAM(s) Oral daily PRN  rivaroxaban 15 milliGRAM(s) Oral <User Schedule>  sertraline 50 milliGRAM(s) Oral at bedtime      Allergies    shellfish (Anaphylaxis)  sulfamethoxazole (Unknown)    Intolerances            Vital Signs Last 24 Hrs  T(C): 36.8 (2017 13:34), Max: 37.3 (2017 21:02)  T(F): 98.2 (2017 13:34), Max: 99.1 (2017 21:02)  HR: 77 (2017 13:34) (67 - 80)  BP: 135/64 (2017 13:34) (112/65 - 150/69)  BP(mean): --  RR: 18 (2017 13:34) (16 - 18)  SpO2: 100% (2017 13:34) (93% - 100%)      REVIEW OF SYSTEMS:     Constitutional: No fever, chills, fatigue, weakness  Eyes: no eye pain, visual disturbances, or discharge  ENT:  No difficulty hearing, tinnitus, vertigo; No sinus or throat pain  Neck: No pain or stiffness  Respiratory: No cough, dyspnea, wheezing   Cardiovascular: No chest pain, palpitations,   Gastrointestinal: No abdominal or epigastric pain. No nausea, vomiting  No diarrhea or constipation.   Genitourinary: No dysuria, frequency, hematuria or incontinence  Neurological: No headaches, lightheadedness, vertigo, numbness or tremors  Psychiatric: No depression, anxiety, mood swings or difficulty sleeping  Musculoskeletal: No joint pain or swelling; No muscle, back or extremity pain  Skin: No itching, burning, rashes or lesions   Lymph Nodes: No enlarged glands  Endocrine: No heat or cold intolerance; No hair loss   Allergy and Immunologic: No hives or eczema    On Neurological Examination:    Mental Status - Patient is alert, awake, oriented X3.     Follow simple commands  Follow complex commands      Speech -   Fluent                          Cranial Nerves - Pupils 3 mm equal and reactive to light,   extraocular eye movements intact.   smile symmetric  intact bilateral NLF    Motor Exam -   Right upper 5/5  Left upper 5/5  Right lower4/5  Left lower  4/5    Muscle tone - is normal all over.  No asymmetry is seen.      Sensory    Bilateral intact to light touch    Gait -  normal  ataxia     GENERAL Exam: Nontoxic , No Acute Distress   	  HEENT:  normocephalic, atraumatic  		  LUNGS: Clear bilaterally      HEART: Normal S1S2   No murmur RRR        	  GI/ ABDOMEN:  Soft  Non tender    EXTREMITIES:   No Edema  No Clubbing  No Cyanosis No Edema    MUSCULOSKELETAL: Normal Range of Motion decreased Range of Motion all 4 extremities   	   SKIN: Normal  No Ecchymosis               LABS:  CBC Full  -  ( 2017 07:23 )  WBC Count : 12.4 K/uL  Hemoglobin : 12.6 g/dL  Hematocrit : 38.7 %  Platelet Count - Automated : 278 K/uL  Mean Cell Volume : 86.3 fl  Mean Cell Hemoglobin : 28.0 pg  Mean Cell Hemoglobin Concentration : 32.4 gm/dL  Auto Neutrophil # : 8.8 K/uL  Auto Lymphocyte # : 2.5 K/uL  Auto Monocyte # : 0.9 K/uL  Auto Eosinophil # : 0.1 K/uL  Auto Basophil # : 0.1 K/uL  Auto Neutrophil % : 71.1 %  Auto Lymphocyte % : 19.9 %  Auto Monocyte % : 07.4 %  Auto Eosinophil % : 1.0 %  Auto Basophil % : 0.7 %    Urinalysis Basic - ( 2017 16:29 )    Color: Yellow / Appearance: Clear / S.015 / pH: x  Gluc: x / Ketone: Negative  / Bili: Negative / Urobili: Negative mg/dL   Blood: x / Protein: 100 mg/dL / Nitrite: Negative   Leuk Esterase: Trace / RBC: Negative /HPF / WBC 0-2   Sq Epi: x / Non Sq Epi: Occasional / Bacteria: Negative          TPro  x   /  Alb  x   /  TBili  x   /  DBili  x   /  AST  35  /  ALT  54  /  AlkPhos  x   11-06    Hemoglobin A1C:     LIVER FUNCTIONS - ( 2017 11:18 )  Alb: x     / Pro: x     / ALK PHOS: x     / ALT: x     / AST: x     / GGT: 15 U/L       Vitamin B12         RADIOLOGY.    ANALYSIS AND PLAN:  A 89-year-old with episode of dizziness in regards to lightheadedness, fall, and generalized weakness.  1.	For episode of lightheadedness, dizziness, suspect this could be secondary to a vasovagal event leading to a presyncopal type of picture causing the patient to have an episode of fall.  2.	I would recommend telemetry evaluation.  3.	I would recommend echocardiogram.  4.	For episode of generalized weakness, this appeared to be in the context of possibly hyperventilating, questionable panic attack.  Once the patient had received some Ativan, the patient appeared to have calmed down and her muscle strength did appear to improve.  Less likely, I feel that this was a cerebrovascular accident or a seizure event.  5.	Recommend physical therapy evaluation.  6.	Recommend fall precaution.  7.	I spoke with daughter today , Amparo, in great detail.  Her telephone number is [phone].  We will continue to follow.    Thank you for the courtesy of consultation.      Physical therapy evaluation.  OOB to chair/ambulation with assistance only.  30 minutes spent on total encounter; more than 50% of the visit was spent counseling and/or coordinating care by the attending physician.

## 2017-12-19 PROCEDURE — 82962 GLUCOSE BLOOD TEST: CPT

## 2017-12-19 PROCEDURE — 85027 COMPLETE CBC AUTOMATED: CPT

## 2017-12-19 PROCEDURE — 96374 THER/PROPH/DIAG INJ IV PUSH: CPT

## 2017-12-19 PROCEDURE — 84450 TRANSFERASE (AST) (SGOT): CPT

## 2017-12-19 PROCEDURE — 76700 US EXAM ABDOM COMPLETE: CPT

## 2017-12-19 PROCEDURE — 82977 ASSAY OF GGT: CPT

## 2017-12-19 PROCEDURE — 70450 CT HEAD/BRAIN W/O DYE: CPT

## 2017-12-19 PROCEDURE — 87086 URINE CULTURE/COLONY COUNT: CPT

## 2017-12-19 PROCEDURE — 82553 CREATINE MB FRACTION: CPT

## 2017-12-19 PROCEDURE — 97161 PT EVAL LOW COMPLEX 20 MIN: CPT

## 2017-12-19 PROCEDURE — 97116 GAIT TRAINING THERAPY: CPT

## 2017-12-19 PROCEDURE — 84484 ASSAY OF TROPONIN QUANT: CPT

## 2017-12-19 PROCEDURE — 85610 PROTHROMBIN TIME: CPT

## 2017-12-19 PROCEDURE — 84460 ALANINE AMINO (ALT) (SGPT): CPT

## 2017-12-19 PROCEDURE — 93005 ELECTROCARDIOGRAM TRACING: CPT

## 2017-12-19 PROCEDURE — 93880 EXTRACRANIAL BILAT STUDY: CPT

## 2017-12-19 PROCEDURE — 85730 THROMBOPLASTIN TIME PARTIAL: CPT

## 2017-12-19 PROCEDURE — 99285 EMERGENCY DEPT VISIT HI MDM: CPT

## 2017-12-19 PROCEDURE — 71100 X-RAY EXAM RIBS UNI 2 VIEWS: CPT

## 2017-12-19 PROCEDURE — 81001 URINALYSIS AUTO W/SCOPE: CPT

## 2017-12-19 PROCEDURE — 82550 ASSAY OF CK (CPK): CPT

## 2017-12-19 PROCEDURE — 71045 X-RAY EXAM CHEST 1 VIEW: CPT

## 2017-12-19 PROCEDURE — 94640 AIRWAY INHALATION TREATMENT: CPT

## 2017-12-19 PROCEDURE — 80053 COMPREHEN METABOLIC PANEL: CPT

## 2017-12-19 PROCEDURE — 93306 TTE W/DOPPLER COMPLETE: CPT

## 2019-10-18 NOTE — PHYSICAL THERAPY INITIAL EVALUATION ADULT - LIVES WITH, PROFILE
CARDIOLOGY FOLLOW UP - Dr. Mazariegos    CC no cp or sob       PHYSICAL EXAM:  T(C): 36.7 (10-18-19 @ 04:43), Max: 37.3 (10-17-19 @ 20:22)  HR: 91 (10-18-19 @ 04:43) (81 - 101)  BP: 151/94 (10-18-19 @ 04:43) (126/99 - 151/94)  RR: 19 (10-18-19 @ 04:43) (18 - 19)  SpO2: 97% (10-18-19 @ 04:43) (92% - 97%)  Wt(kg): --  I&O's Summary    17 Oct 2019 07:01  -  18 Oct 2019 07:00  --------------------------------------------------------  IN: 0 mL / OUT: 1950 mL / NET: -1950 mL        Appearance: Normal	  Cardiovascular: Normal S1 S2,irregular   Respiratory: diminished   Gastrointestinal:  Soft, Non-tender, + BS	  Extremities: bl LE edema        MEDICATIONS  (STANDING):  aspirin enteric coated 81 milliGRAM(s) Oral daily  buMETAnide Injectable 2 milliGRAM(s) IV Push daily  dabigatran 150 milliGRAM(s) Oral two times a day  dextrose 5%. 1000 milliLiter(s) (50 mL/Hr) IV Continuous <Continuous>  dextrose 50% Injectable 12.5 Gram(s) IV Push once  dextrose 50% Injectable 25 Gram(s) IV Push once  dextrose 50% Injectable 25 Gram(s) IV Push once  influenza   Vaccine 0.5 milliLiter(s) IntraMuscular once  insulin glargine Injectable (LANTUS) 21 Unit(s) SubCutaneous at bedtime  insulin lispro (HumaLOG) corrective regimen sliding scale   SubCutaneous three times a day before meals  insulin lispro (HumaLOG) corrective regimen sliding scale   SubCutaneous at bedtime  metolazone 5 milliGRAM(s) Oral <User Schedule>  metoprolol succinate  milliGRAM(s) Oral daily  pregabalin 100 milliGRAM(s) Oral two times a day      TELEMETRY: afib HR   brief episode to 170 yesterday 	    ECG:  	  RADIOLOGY:   DIAGNOSTIC TESTING:  [ ] Echocardiogram:  < from: TTE with Doppler (w/Cont) (10.17.19 @ 10:23) >  EF (Visual Estimate): 45 %  Doppler Peak Velocity (m/sec): AoV=1.0  ------------------------------------------------------------------------  Observations:  Mitral Valve: Mitral annular calcification, otherwise  normal mitral valve. Minimal mitral regurgitation.  Aortic Valve/Aorta: Calcified aortic valve. Peak  transaortic valve gradient equals 4 mm Hg, mean transaortic  valve gradient equals 3 mm Hg, aortic valve velocity time  integral equals 14 cm, estimated aortic valve area equals  3.3 sqcm. Peak left ventricular outflow tract gradient  equals 2 mm Hg, mean gradient is equal to 1 mm Hg, LVOT  velocity time integral equals 11 cm.  Aortic Root: 3.6 cm.  LVOT diameter: 2.3 cm.  Left Atrium: Moderate left atrial enlargement.  Left Ventricle: Technically difficult study despite use of  echo contrast. Grossly borderline left ventricular systolic  dysfunction. Endocardial visualization enhanced with  intravenous injection of Ultrasonic Enhancing Agent  (Definity). No obvious wall wall motion abnormalities.  Normal left ventricular internal dimensions and wall  thicknesses.  Right Heart: Normal right atrium. Right ventricle not well  seen.  Normal tricuspid valve. Normal pulmonic valve.  Pericardium/Pleura: Normal pericardium with no pericardial  effusion.  Hemodynamic: Estimated right atrial pressure is 8 mm Hg.  ------------------------------------------------------------------------  Conclusions:  1. Moderate left atrial enlargement.  2. Normal left ventricular internal dimensions and wall  thicknesses.  3. Technically difficult study despite use of echo  contrast. Grossly borderline left ventricular systolic  dysfunction. Endocardial visualization enhanced with  intravenous injection of Ultrasonic Enhancing Agent  (Definity). No obvious wall wall motion abnormalities.  4. Right ventricle not well seen.  *** Compared with echocardiogram of 12/29/2018, no  significant changes noted (reported LVEF in last study is  slightly lower, but atrial fibrillation makes it difficult  to assess ejection fraction).  ------------------------------------------------------------------------  Confirmed on  10/17/2019 - 17:38:04 by Kishan Pickett M.D.  ------------------------------------------------------------------------    < end of copied text >    [ ]  Catheterization:  [ ] Stress Test:    OTHER: 	    LABS:	 	    Creatine Kinase, Serum: 80 U/L [30 - 200] (10-16 @ 14:38)  CKMB Units: 4.0 ng/mL [0.0 - 6.7] (10-16 @ 14:38)  Troponin T, High Sensitivity Result: 68 ng/L [0 - 51] (10-16 @ 14:38)  Troponin T, High Sensitivity Result: 76 ng/L [0 - 51] (10-16 @ 10:41)                          16.2   9.86  )-----------( 169      ( 18 Oct 2019 07:02 )             51.5     10-18    136  |  92<L>  |  50<H>  ----------------------------<  221<H>  3.6   |  28  |  1.94<H>    Ca    9.3      18 Oct 2019 07:01    TPro  7.1  /  Alb  3.0<L>  /  TBili  0.6  /  DBili  x   /  AST  11  /  ALT  6<L>  /  AlkPhos  106  10-18    PT/INR - ( 16 Oct 2019 10:41 )   PT: 13.7 sec;   INR: 1.19 ratio         PTT - ( 16 Oct 2019 10:41 )  PTT:41.5 sec alone

## 2020-03-10 NOTE — CONSULT NOTE ADULT - SUBJECTIVE AND OBJECTIVE BOX
Brief Nutrition Note:    To have venting g-tube placed today. Unable to place yesterday due to retained contrast in stomach. Palliative care seeing pt. Plans for hospice eval, but not until after g-tube placed.     Continue TPN for now, but suspect this CARDIOLOGY CONSULT NOTE    Patient is a 89y Female with a known history of :  Personal history of urinary infection (Z87.440)  Leukocytosis, unspecified type (D72.829)  Renal failure, chronic, stage 3 (moderate) (N18.3)  Abnormal liver enzymes (R74.8)  Uncomplicated asthma, unspecified asthma severity, unspecified whether persistent (J45.909)  Long term (current) use of antithrombotics/antiplatelets (Z79.02)  Chronic atrial fibrillation (I48.2)  Vasovagal syncope (R55)  Syncope, near (R55)    HPI:  · HPI Objective Statement: 89 y/o F presents to the ED BIB EMS from home for generalized weakness, lightheadedness and fall today. Pt's daughter states that pt is normally lucid and sharp and lives alone. Today, pt called daughter at about 1100, sounding weak and short of breath. Daughter drove over to pt's home and found her, fully dressed and with rollers in her hair, sitting, leaning over, at the kitchen table appearing weak. Pt explains that she had gotten ready in the morning, and was having a BM in the restroom when she suddenly felt lightheaded and fell off the toilet. She denies hitting her head but notes that she hit her L arm. Notes L arm and R leg pain. She got off the floor and made her way over the kitchen but felt too lightheaded and had to sit down, and called her daughter. Pt also notes that her tongue is hurting at the moment. Pt takes Xarelto 15 mg (hx of Afib), Multaq, Advair., Zoloft. Hx of asthma, had a flare up this week. Daughter notes that pt is having difficulty speaking. Daughter notes pt did not vomit since daughter got to the scene, unaware if she did prior. PMHx: asthma, multiple falls. PSHx: hip replacement, femur surgery, knee replacement, cataract (1 year ago).	  · Presenting Symptoms: lightheaded, generalized weakness	  · Negative Findings: no blurred vision	  · Timing: sudden onset	  · Duration: today	  · Context: unknown	  · Aggravating Factors: none	  · Relieving Factors: none	  H/o of UTI's, WBC 16k, no fever, or dysuria (05 Nov 2017 15:05)      REVIEW OF SYSTEMS:    CONSTITUTIONAL: No fever, weight loss, or fatigue  EYES: No eye pain, visual disturbances, or discharge  ENMT:  No difficulty hearing, tinnitus, vertigo; No sinus or throat pain  NECK: No pain or stiffness  BREASTS: No pain, masses, or nipple discharge  RESPIRATORY: No cough, wheezing, chills or hemoptysis; No shortness of breath  CARDIOVASCULAR: No chest pain, palpitations, dizziness, or leg swelling  GASTROINTESTINAL: No abdominal or epigastric pain. No nausea, vomiting, or hematemesis; No diarrhea or constipation. No melena or hematochezia.  GENITOURINARY: No dysuria, frequency, hematuria, or incontinence  NEUROLOGICAL: No headaches, memory loss, loss of strength, numbness, or tremors  SKIN: No itching, burning, rashes, or lesions   LYMPH NODES: No enlarged glands  ENDOCRINE: No heat or cold intolerance; No hair loss  MUSCULOSKELETAL: No joint pain or swelling; No muscle, back, or extremity pain  PSYCHIATRIC: No depression, anxiety, mood swings, or difficulty sleeping  HEME/LYMPH: No easy bruising, or bleeding gums  ALLERGY AND IMMUNOLOGIC: No hives or eczema    MEDICATIONS  (STANDING):  buDESOnide 160 MICROgram(s)/formoterol 4.5 MICROgram(s) Inhaler 2 Puff(s) Inhalation two times a day  dronedarone 400 milliGRAM(s) Oral two times a day  sertraline 50 milliGRAM(s) Oral daily    MEDICATIONS  (PRN):      ALLERGIES: shellfish (Anaphylaxis)  sulfamethoxazole (Unknown)      FAMILY HISTORY:      Social History:  Alochol:   Smoking:   Drug Use:   Marital Status:     I&O's Detail      PHYSICAL EXAMINATION:  -----------------------------  T(C): --  HR: 78 (11-05-17 @ 16:29) (72 - 78)  BP: 132/60 (11-05-17 @ 16:29) (120/64 - 160/78)  RR: 18 (11-05-17 @ 16:29) (18 - 18)  SpO2: 98% (11-05-17 @ 16:29) (98% - 99%)  Wt(kg): --    Height (cm): 160.02 (11-05 @ 15:05)  Weight (kg): 68 (11-05 @ 15:05)  BMI (kg/m2): 26.6 (11-05 @ 15:05)  BSA (m2): 1.71 (11-05 @ 15:05)    Constitutional: well developed, normal appearance, well groomed, well nourished, no deformities and no acute distress.   Eyes: the conjunctiva exhibited no abnormalities and the eyelids demonstrated no xanthelasmas.   HEENT: normal oral mucosa, no oral pallor and no oral cyanosis.   Neck: normal jugular venous A waves present, normal jugular venous V waves present and no jugular venous monroe A waves.   Pulmonary: no respiratory distress, normal respiratory rhythm and effort, no accessory muscle use and lungs were clear to auscultation bilaterally.   Cardiovascular: heart rate and rhythm were irreg/irreg, normal S1 and S2 and no murmur.   Musculoskeletal: the gait could not be assessed.  Extremities: no clubbing of the fingernails, no localized cyanosis, no petechial hemorrhages and no ischemic changes.   Skin: normal skin color and pigmentation, no rash, no venous stasis, no skin lesions, no skin ulcer and no xanthoma was observed.   Psychiatric: oriented to person, place, and time, the affect was normal, the mood was normal and not feeling anxious.     LABS:   --------  11-05    138  |  99  |  23  ----------------------------<  113<H>  4.2   |  27  |  1.14    Ca    9.4      05 Nov 2017 13:13    TPro  7.5  /  Alb  3.6  /  TBili  0.7  /  DBili  x   /  AST  72<H>  /  ALT  75<H>  /  AlkPhos  97  11-05                         14.0   16.4  )-----------( 381      ( 05 Nov 2017 13:13 )             46.3     PT/INR - ( 05 Nov 2017 13:13 )   PT: 12.6 sec;   INR: 1.15 ratio         PTT - ( 05 Nov 2017 13:13 )  PTT:28.5 sec    11-05 @ 13:32 CPK total:--, CKMB --, Troponin I - .002 ng/mL<L> [.017 - .056]            RADIOLOGY:  -----------------    < from: CT Head No Cont (11.05.17 @ 13:01) >    EXAM:  CT BRAIN                                  PROCEDURE DATE:  11/05/2017          INTERPRETATION:  History: Weakness altered mental status.    Noncontrast head CT. Prior 10/16/2011.    Age-appropriate cerebral parenchymal volume loss mild chronic ischemic   white matter changes. No territorial infarct bleed extra-axial collection   or mass effect. Basal ganglia calcification. Partially empty sella. Clear   sinuses. Cranium intact. Status post bilateral cataract surgery.    Impression: No acute or focal brain pathology. Partially empty sella.                  MADELINE FRAUSTO M.D., ATTENDING RADIOLOGIST  This document has been electronically signed. Nov 5 2017  1:07PM                < end of copied text >      ECG: A-Fib

## 2020-07-24 ENCOUNTER — INPATIENT (INPATIENT)
Facility: HOSPITAL | Age: 85
LOS: 0 days | Discharge: OTHER INSTITUTION | DRG: 293 | End: 2020-07-25
Attending: INTERNAL MEDICINE | Admitting: INTERNAL MEDICINE
Payer: MEDICARE

## 2020-07-24 ENCOUNTER — EMERGENCY (EMERGENCY)
Facility: HOSPITAL | Age: 85
LOS: 1 days | Discharge: ANOTHER HOSPITAL | End: 2020-07-24
Attending: EMERGENCY MEDICINE | Admitting: EMERGENCY MEDICINE
Payer: MEDICARE

## 2020-07-24 VITALS
OXYGEN SATURATION: 98 % | HEART RATE: 90 BPM | RESPIRATION RATE: 17 BRPM | TEMPERATURE: 98 F | SYSTOLIC BLOOD PRESSURE: 112 MMHG | DIASTOLIC BLOOD PRESSURE: 68 MMHG

## 2020-07-24 VITALS
TEMPERATURE: 98 F | HEIGHT: 62 IN | SYSTOLIC BLOOD PRESSURE: 105 MMHG | HEART RATE: 87 BPM | OXYGEN SATURATION: 95 % | WEIGHT: 134.92 LBS | RESPIRATION RATE: 20 BRPM | DIASTOLIC BLOOD PRESSURE: 62 MMHG

## 2020-07-24 VITALS — WEIGHT: 134.92 LBS | HEIGHT: 62 IN

## 2020-07-24 DIAGNOSIS — Z96.652 PRESENCE OF LEFT ARTIFICIAL KNEE JOINT: Chronic | ICD-10-CM

## 2020-07-24 DIAGNOSIS — S72.002A FRACTURE OF UNSPECIFIED PART OF NECK OF LEFT FEMUR, INITIAL ENCOUNTER FOR CLOSED FRACTURE: Chronic | ICD-10-CM

## 2020-07-24 DIAGNOSIS — Z98.41 CATARACT EXTRACTION STATUS, RIGHT EYE: Chronic | ICD-10-CM

## 2020-07-24 DIAGNOSIS — Z96.641 PRESENCE OF RIGHT ARTIFICIAL HIP JOINT: Chronic | ICD-10-CM

## 2020-07-24 DIAGNOSIS — Z96.643 PRESENCE OF ARTIFICIAL HIP JOINT, BILATERAL: Chronic | ICD-10-CM

## 2020-07-24 DIAGNOSIS — Z98.42 CATARACT EXTRACTION STATUS, LEFT EYE: Chronic | ICD-10-CM

## 2020-07-24 DIAGNOSIS — Z87.81 PERSONAL HISTORY OF (HEALED) TRAUMATIC FRACTURE: Chronic | ICD-10-CM

## 2020-07-24 LAB
ALBUMIN SERPL ELPH-MCNC: 2.3 G/DL — LOW (ref 3.3–5)
ALP SERPL-CCNC: 86 U/L — SIGNIFICANT CHANGE UP (ref 30–120)
ALT FLD-CCNC: 41 U/L DA — SIGNIFICANT CHANGE UP (ref 10–60)
ANION GAP SERPL CALC-SCNC: 10 MMOL/L — SIGNIFICANT CHANGE UP (ref 5–17)
APTT BLD: 39.7 SEC — HIGH (ref 27.5–35.5)
AST SERPL-CCNC: 22 U/L — SIGNIFICANT CHANGE UP (ref 10–40)
BASOPHILS # BLD AUTO: 0.02 K/UL — SIGNIFICANT CHANGE UP (ref 0–0.2)
BASOPHILS NFR BLD AUTO: 0.2 % — SIGNIFICANT CHANGE UP (ref 0–2)
BILIRUB SERPL-MCNC: 0.2 MG/DL — SIGNIFICANT CHANGE UP (ref 0.2–1.2)
BUN SERPL-MCNC: 21 MG/DL — SIGNIFICANT CHANGE UP (ref 7–23)
CALCIUM SERPL-MCNC: 8.9 MG/DL — SIGNIFICANT CHANGE UP (ref 8.4–10.5)
CHLORIDE SERPL-SCNC: 107 MMOL/L — SIGNIFICANT CHANGE UP (ref 96–108)
CO2 SERPL-SCNC: 26 MMOL/L — SIGNIFICANT CHANGE UP (ref 22–31)
CREAT SERPL-MCNC: 1.04 MG/DL — SIGNIFICANT CHANGE UP (ref 0.5–1.3)
EOSINOPHIL # BLD AUTO: 0.57 K/UL — HIGH (ref 0–0.5)
EOSINOPHIL NFR BLD AUTO: 6.7 % — HIGH (ref 0–6)
GLUCOSE SERPL-MCNC: 149 MG/DL — HIGH (ref 70–99)
HCT VFR BLD CALC: 30.4 % — LOW (ref 34.5–45)
HGB BLD-MCNC: 9.7 G/DL — LOW (ref 11.5–15.5)
IMM GRANULOCYTES NFR BLD AUTO: 0.6 % — SIGNIFICANT CHANGE UP (ref 0–1.5)
INR BLD: 1.53 RATIO — HIGH (ref 0.88–1.16)
LYMPHOCYTES # BLD AUTO: 1.35 K/UL — SIGNIFICANT CHANGE UP (ref 1–3.3)
LYMPHOCYTES # BLD AUTO: 16 % — SIGNIFICANT CHANGE UP (ref 13–44)
MCHC RBC-ENTMCNC: 26.8 PG — LOW (ref 27–34)
MCHC RBC-ENTMCNC: 31.9 GM/DL — LOW (ref 32–36)
MCV RBC AUTO: 84 FL — SIGNIFICANT CHANGE UP (ref 80–100)
MONOCYTES # BLD AUTO: 0.7 K/UL — SIGNIFICANT CHANGE UP (ref 0–0.9)
MONOCYTES NFR BLD AUTO: 8.3 % — SIGNIFICANT CHANGE UP (ref 2–14)
NEUTROPHILS # BLD AUTO: 5.77 K/UL — SIGNIFICANT CHANGE UP (ref 1.8–7.4)
NEUTROPHILS NFR BLD AUTO: 68.2 % — SIGNIFICANT CHANGE UP (ref 43–77)
NRBC # BLD: 0 /100 WBCS — SIGNIFICANT CHANGE UP (ref 0–0)
NT-PROBNP SERPL-SCNC: 8492 PG/ML — HIGH (ref 0–450)
PLATELET # BLD AUTO: 385 K/UL — SIGNIFICANT CHANGE UP (ref 150–400)
POTASSIUM SERPL-MCNC: 3.8 MMOL/L — SIGNIFICANT CHANGE UP (ref 3.5–5.3)
POTASSIUM SERPL-SCNC: 3.8 MMOL/L — SIGNIFICANT CHANGE UP (ref 3.5–5.3)
PROT SERPL-MCNC: 6.4 G/DL — SIGNIFICANT CHANGE UP (ref 6–8.3)
PROTHROM AB SERPL-ACNC: 18.1 SEC — HIGH (ref 10.6–13.6)
RBC # BLD: 3.62 M/UL — LOW (ref 3.8–5.2)
RBC # FLD: 15.1 % — HIGH (ref 10.3–14.5)
SODIUM SERPL-SCNC: 143 MMOL/L — SIGNIFICANT CHANGE UP (ref 135–145)
TROPONIN I SERPL-MCNC: 0 NG/ML — LOW (ref 0.02–0.06)
WBC # BLD: 8.46 K/UL — SIGNIFICANT CHANGE UP (ref 3.8–10.5)
WBC # FLD AUTO: 8.46 K/UL — SIGNIFICANT CHANGE UP (ref 3.8–10.5)

## 2020-07-24 PROCEDURE — 71045 X-RAY EXAM CHEST 1 VIEW: CPT | Mod: 26

## 2020-07-24 PROCEDURE — 83880 ASSAY OF NATRIURETIC PEPTIDE: CPT

## 2020-07-24 PROCEDURE — 71250 CT THORAX DX C-: CPT | Mod: 26

## 2020-07-24 PROCEDURE — 84484 ASSAY OF TROPONIN QUANT: CPT

## 2020-07-24 PROCEDURE — 96374 THER/PROPH/DIAG INJ IV PUSH: CPT

## 2020-07-24 PROCEDURE — 99285 EMERGENCY DEPT VISIT HI MDM: CPT | Mod: 25

## 2020-07-24 PROCEDURE — 93010 ELECTROCARDIOGRAM REPORT: CPT

## 2020-07-24 PROCEDURE — 93005 ELECTROCARDIOGRAM TRACING: CPT

## 2020-07-24 PROCEDURE — 85027 COMPLETE CBC AUTOMATED: CPT

## 2020-07-24 PROCEDURE — 80053 COMPREHEN METABOLIC PANEL: CPT

## 2020-07-24 PROCEDURE — 71045 X-RAY EXAM CHEST 1 VIEW: CPT

## 2020-07-24 PROCEDURE — 36415 COLL VENOUS BLD VENIPUNCTURE: CPT

## 2020-07-24 PROCEDURE — 85730 THROMBOPLASTIN TIME PARTIAL: CPT

## 2020-07-24 PROCEDURE — 99285 EMERGENCY DEPT VISIT HI MDM: CPT

## 2020-07-24 PROCEDURE — 71250 CT THORAX DX C-: CPT

## 2020-07-24 PROCEDURE — 85610 PROTHROMBIN TIME: CPT

## 2020-07-24 RX ORDER — ALBUTEROL 90 UG/1
0 AEROSOL, METERED ORAL
Qty: 0 | Refills: 0 | DISCHARGE

## 2020-07-24 RX ORDER — FUROSEMIDE 40 MG
40 TABLET ORAL ONCE
Refills: 0 | Status: COMPLETED | OUTPATIENT
Start: 2020-07-24 | End: 2020-07-24

## 2020-07-24 RX ORDER — SERTRALINE 25 MG/1
1 TABLET, FILM COATED ORAL
Qty: 0 | Refills: 0 | DISCHARGE

## 2020-07-24 RX ORDER — LORATADINE 10 MG/1
1 TABLET ORAL
Qty: 0 | Refills: 0 | DISCHARGE

## 2020-07-24 RX ADMIN — Medication 40 MILLIGRAM(S): at 20:54

## 2020-07-24 NOTE — ED PROVIDER NOTE - OBJECTIVE STATEMENT
89 year old female with a PMHx of Anxiety on Xanax, Afib on Xarelto BIB EMS to the ED for SOB, hyperventilating. Hx obtained from daughter, states that she gave the pt .25 of Xanax last night and .5 of Xanax at 1:30pm today. State that this is typical of her previous anxiety attacks. Daughter states that at baseline, pt is able to exercise and ambulate on her own but today was not able to walk. Saw her PCP two weeks ago, states had normal blood work then. Denies any fever, cough, chest pain, N/V/D, abd pain. No positive COVID contacts.

## 2020-07-24 NOTE — ED ADULT NURSE NOTE - NSIMPLEMENTINTERV_GEN_ALL_ED
Implemented All Fall with Harm Risk Interventions:  [place] to call system. Call bell, personal items and telephone within reach. Instruct patient to call for assistance. Room bathroom lighting operational. Non-slip footwear when patient is off stretcher. Physically safe environment: no spills, clutter or unnecessary equipment. Stretcher in lowest position, wheels locked, appropriate side rails in place. Provide visual cue, wrist band, yellow gown, etc. Monitor gait and stability. Monitor for mental status changes and reorient to person, place, and time. Review medications for side effects contributing to fall risk. Reinforce activity limits and safety measures with patient and family. Provide visual clues: red socks.

## 2020-07-24 NOTE — ED ADULT NURSE NOTE - PSH
Hip fracture, left    History of hip replacement, total, bilateral    History of knee replacement, total, left    S/P cataract surgery, left    S/P cataract surgery, right

## 2020-07-24 NOTE — ED ADULT NURSE NOTE - OBJECTIVE STATEMENT
Pt presents with SOB o2 sat 98% on 3L NC     lungs sounds with minimal crackles, pt able to speak in complete sentences    and has voided, afib on monitor

## 2020-07-24 NOTE — ED ADULT NURSE NOTE - SUICIDE SCREENING DEPRESSION
Parent presents patient to clinic with request for a sports physical and camp physical.  Denies any significant health concerns.     Denies any acute complaints at this time.    See sports physical form under scanned documents.    Normal sports physical except slightly decreased left vision.  Cleared for all activities with the recomendation for updated eye exam in 3 months.   
Negative

## 2020-07-24 NOTE — ED PROVIDER NOTE - PHYSICAL EXAMINATION
Gen: Alert, NAD but appears slightly anxious  Head/eyes: NC/AT, PERRL, EOMI, normal lids/conjunctiva, no scleral icterus  ENT: Airway patent  Neck: supple, no tenderness/meningismus/JVD, Trachea midline  Pulm/lung: Bilateral clear BS, normal resp effort, no wheeze/stridor/retractions  CV/heart: RRR, no M/R/G, +2 dist pulses (radial, pedal DP/PT, popliteal)  GI/Abd: soft, NT/ND, +BS, no guarding/rebound tenderness  Musculoskeletal: no edema/erythema/cyanosis, FROM in all extremities, no C/T/L spine ttp  Skin: no rash, no vesicles, no petechaie, no ecchymosis, no swelling  Neuro: AAOx3, CN 2-12 intact, normal sensation, 5/5 motor strength in all extremities, normal gait, no dysmetria Gen: Alert, NAD but appears slightly anxious  Head/eyes: NC/AT, PERRL, EOMI, normal lids/conjunctiva, no scleral icterus  ENT: Airway patent  Neck: supple, no tenderness/meningismus/JVD, Trachea midline  Pulm/lung: b/l crackles at bases, normal resp effort, no wheeze/stridor/retractions  CV/heart: RRR, no M/R/G, +2 dist pulses (radial, pedal DP/PT, popliteal)  GI/Abd: soft, NT/ND, +BS, no guarding/rebound tenderness  Musculoskeletal: no edema/erythema/cyanosis, FROM in all extremities, no C/T/L spine ttp  Skin: no rash, no vesicles, no petechaie, no ecchymosis, no swelling  Neuro: AAOx3, CN 2-12 intact, normal sensation, 5/5 motor strength in all extremities, normal gait, no dysmetria

## 2020-07-24 NOTE — ED PROVIDER NOTE - OBJECTIVE STATEMENT
89 year old female with a PMHx of Anxiety on Xanax, Afib on Xarelto BIB EMS to the ED for SOB.  transfer from [place] ED for admission for CHF exacerbation.  gradually worsening SOB over the past few days.  lasix given at [place].  pt feels well at this time.

## 2020-07-24 NOTE — ED ADULT NURSE NOTE - PMH
Acute congestive heart failure, unspecified heart failure type    Anxiety    Asthma    Atrial fibrillation    Macular degeneration    Presbycusis of both ears

## 2020-07-24 NOTE — ED PROVIDER NOTE - NS ED ROS FT
Constitutional: - Fever, - Chills, - Anorexia, - Fatigue, - Night sweats, +Anxiety  Eyes: - Discharge, - Irritation, - Redness, - Visual changes, - Light sensitivity, - Pain  EARS: - Ear Pain, - Tinnitus, - Decreased hearing  NOSE: - Congestion, - Bloody nose  MOUTH/THROAT: - Vocal Changes, - Drooling, - Sore throat  NECK: - Lumps, - Stiffness, - Pain  CV: - Palpitations, - Chest Pain, - Edema, - Syncope  RESP:  - Cough, +Shortness of Breath, - Dyspnea on Exertion, - Trouble speaking, - Pleuritic pain - Wheezing  GI: - Diarrhea, - Constipation, - Bloody stools, - Nausea, - Vomiting, - Abdominal Pain  : - Dysuria, -Frequency, - Hematuria, - Hesitancy, - Incontinence, - Saddle Anesthesia, - Abnormal discharge  MSK: - Myalgias, - Arthralgias, - Weakness, - Deformities, - Injuries  SKIN: - Color change, - Rash, - Swelling, - Ecchymosis, - Abrasion, - laceration  NEURO: - Change in behavior, - Dec. Alertness, - Headache, - Dizziness, - Change in speech, - Weakness, - Seizure-like activity, - Difficulty ambulating

## 2020-07-24 NOTE — ED PROVIDER NOTE - CARE PLAN
Principal Discharge DX:	Acute congestive heart failure, unspecified heart failure type  Secondary Diagnosis:	Shortness of breath

## 2020-07-24 NOTE — ED ADULT NURSE NOTE - CHIEF COMPLAINT QUOTE
brought in by EMS from [facility]; plan for admission for CHF exacerbation, patient reports shortness of breath and anxiety. patient received IV lasix 40mg prior at [place]

## 2020-07-24 NOTE — ED PROVIDER NOTE - PMH
Asthma    Atrial fibrillation    History of hip replacement, total, right    Macular degeneration (senile) of retina    Presbycusis of both ears  hearing aid

## 2020-07-24 NOTE — ED ADULT NURSE NOTE - NSFALLRSKPASTHIST_ED_ALL_ED
"Spoke with patient and message given regarding result notes. Pt verbalized understanding. No further questions or concerns. Per e-mail Dr Dandre Torres sent to patient: (patient has not reviewed through Mel[facility] Gomez)  ""Great to see such nice improvement in the glycohemoglobin, keep up the good work! Your triglycerides were very high, this is affected by carbohydrates but also by genetics. I'd like to recheck a fasting cholesterol panel in 3 months to see if that is persisting. Just put it in your calendar to come in around mid-March for that. We may consider starting a cholesterol medication if it remains that high. Kidney function and liver function looked great. Please let me know if you have any questions or concerns.  \""  " no

## 2020-07-24 NOTE — ED ADULT NURSE NOTE - OBJECTIVE STATEMENT
pt bib daughter for, anxiety pt w/ long standing hx of anxiety currently on Xanax, daughter states that she gave the pt .25 of Xanax last night and .5 of Xanax at 1:30pm today. daughter states that today pt was not able to walk. Saw her PCP two weeks ago, states had normal blood work then. Denies any fever, cough, chest pain, N/V/D, abd pain. No positive COVID contacts.

## 2020-07-24 NOTE — ED ADULT TRIAGE NOTE - CHIEF COMPLAINT QUOTE
brought in by EMS from [facility]; plan for admission for CHF exacerbation, patient reports shortness of breath and anxiety. patient received IV lasix 40mg prior at [place]

## 2020-07-24 NOTE — ED ADULT NURSE NOTE - PMH
Anxiety    Asthma    Atrial fibrillation    History of hip replacement, total, right    Macular degeneration (senile) of retina    Presbycusis of both ears  hearing aid

## 2020-07-24 NOTE — ED PROVIDER NOTE - CLINICAL SUMMARY MEDICAL DECISION MAKING FREE TEXT BOX
89 year old female here with some SOB, anxiety, will check labs, XR, monitor, EKG, vital signs stable, discussed case with daughter.

## 2020-07-24 NOTE — ED PROVIDER NOTE - PROGRESS NOTE DETAILS
discussed case with Dr. Rubio will admit at [facility], Dr. Figueroa aware in ED.  Daughter made aware of transfer results discussed

## 2020-07-24 NOTE — ED PROVIDER NOTE - NS ED MD DISPO SPECIAL CONSIDERATION1
10.1   8.05  )-----------( 154      ( 15 May 2018 07:38 )             32.0     05-15    141  |  103  |  54<H>  ----------------------------<  128<H>  4.2   |  27  |  1.42<H>    Ca    8.8      15 May 2018 06:11  Phos  4.1     05-15  Mg     2.5     05-15
11.0   8.01  )-----------( 136      ( 12 May 2018 07:46 )             33.4   05-12    141  |  104  |  39<H>  ----------------------------<  118<H>  4.4   |  23  |  1.28    Ca    8.9      12 May 2018 05:49  Phos  4.5     05-12  Mg     2.5     05-12
10.3   6.81  )-----------( 153      ( 14 May 2018 09:45 )             31.2     05-14    141  |  102  |  53<H>  ----------------------------<  121<H>  4.0   |  24  |  1.48<H>    Ca    8.6      14 May 2018 06:48  Phos  4.2     05-14  Mg     2.5     05-14
Low Suspicion of COVID-19

## 2020-07-25 ENCOUNTER — TRANSCRIPTION ENCOUNTER (OUTPATIENT)
Age: 85
End: 2020-07-25

## 2020-07-25 VITALS
SYSTOLIC BLOOD PRESSURE: 129 MMHG | HEART RATE: 82 BPM | RESPIRATION RATE: 18 BRPM | TEMPERATURE: 98 F | OXYGEN SATURATION: 91 % | DIASTOLIC BLOOD PRESSURE: 74 MMHG

## 2020-07-25 DIAGNOSIS — H35.30 UNSPECIFIED MACULAR DEGENERATION: ICD-10-CM

## 2020-07-25 DIAGNOSIS — I50.9 HEART FAILURE, UNSPECIFIED: ICD-10-CM

## 2020-07-25 DIAGNOSIS — J45.909 UNSPECIFIED ASTHMA, UNCOMPLICATED: ICD-10-CM

## 2020-07-25 DIAGNOSIS — I48.91 UNSPECIFIED ATRIAL FIBRILLATION: ICD-10-CM

## 2020-07-25 DIAGNOSIS — F41.9 ANXIETY DISORDER, UNSPECIFIED: ICD-10-CM

## 2020-07-25 LAB
ANION GAP SERPL CALC-SCNC: 8 MMOL/L — SIGNIFICANT CHANGE UP (ref 5–17)
BASOPHILS # BLD AUTO: 0.03 K/UL — SIGNIFICANT CHANGE UP (ref 0–0.2)
BASOPHILS NFR BLD AUTO: 0.3 % — SIGNIFICANT CHANGE UP (ref 0–2)
BUN SERPL-MCNC: 19 MG/DL — SIGNIFICANT CHANGE UP (ref 7–23)
CALCIUM SERPL-MCNC: 8.4 MG/DL — LOW (ref 8.5–10.1)
CHLORIDE SERPL-SCNC: 108 MMOL/L — SIGNIFICANT CHANGE UP (ref 96–108)
CK MB BLD-MCNC: 2.7 % — SIGNIFICANT CHANGE UP (ref 0–3.5)
CK MB CFR SERPL CALC: 1.3 NG/ML — SIGNIFICANT CHANGE UP (ref 0–3.6)
CK SERPL-CCNC: 49 U/L — SIGNIFICANT CHANGE UP (ref 26–192)
CO2 SERPL-SCNC: 26 MMOL/L — SIGNIFICANT CHANGE UP (ref 22–31)
CREAT SERPL-MCNC: 0.86 MG/DL — SIGNIFICANT CHANGE UP (ref 0.5–1.3)
EOSINOPHIL # BLD AUTO: 0.51 K/UL — HIGH (ref 0–0.5)
EOSINOPHIL NFR BLD AUTO: 5.9 % — SIGNIFICANT CHANGE UP (ref 0–6)
GLUCOSE SERPL-MCNC: 92 MG/DL — SIGNIFICANT CHANGE UP (ref 70–99)
HCT VFR BLD CALC: 30.5 % — LOW (ref 34.5–45)
HGB BLD-MCNC: 9.9 G/DL — LOW (ref 11.5–15.5)
IMM GRANULOCYTES NFR BLD AUTO: 0.6 % — SIGNIFICANT CHANGE UP (ref 0–1.5)
LYMPHOCYTES # BLD AUTO: 1.43 K/UL — SIGNIFICANT CHANGE UP (ref 1–3.3)
LYMPHOCYTES # BLD AUTO: 16.6 % — SIGNIFICANT CHANGE UP (ref 13–44)
MCHC RBC-ENTMCNC: 26 PG — LOW (ref 27–34)
MCHC RBC-ENTMCNC: 32.5 GM/DL — SIGNIFICANT CHANGE UP (ref 32–36)
MCV RBC AUTO: 80.1 FL — SIGNIFICANT CHANGE UP (ref 80–100)
MONOCYTES # BLD AUTO: 0.69 K/UL — SIGNIFICANT CHANGE UP (ref 0–0.9)
MONOCYTES NFR BLD AUTO: 8 % — SIGNIFICANT CHANGE UP (ref 2–14)
NEUTROPHILS # BLD AUTO: 5.92 K/UL — SIGNIFICANT CHANGE UP (ref 1.8–7.4)
NEUTROPHILS NFR BLD AUTO: 68.6 % — SIGNIFICANT CHANGE UP (ref 43–77)
NRBC # BLD: 0 /100 WBCS — SIGNIFICANT CHANGE UP (ref 0–0)
NT-PROBNP SERPL-SCNC: 6986 PG/ML — HIGH (ref 0–450)
PLATELET # BLD AUTO: 453 K/UL — HIGH (ref 150–400)
POTASSIUM SERPL-MCNC: 3.7 MMOL/L — SIGNIFICANT CHANGE UP (ref 3.5–5.3)
POTASSIUM SERPL-SCNC: 3.7 MMOL/L — SIGNIFICANT CHANGE UP (ref 3.5–5.3)
RBC # BLD: 3.81 M/UL — SIGNIFICANT CHANGE UP (ref 3.8–5.2)
RBC # FLD: 15.4 % — HIGH (ref 10.3–14.5)
SARS-COV-2 RNA SPEC QL NAA+PROBE: SIGNIFICANT CHANGE UP
SODIUM SERPL-SCNC: 142 MMOL/L — SIGNIFICANT CHANGE UP (ref 135–145)
TROPONIN I SERPL-MCNC: <.015 NG/ML — SIGNIFICANT CHANGE UP (ref 0.01–0.04)
TSH SERPL-MCNC: 3.11 UIU/ML — SIGNIFICANT CHANGE UP (ref 0.36–3.74)
WBC # BLD: 8.63 K/UL — SIGNIFICANT CHANGE UP (ref 3.8–10.5)
WBC # FLD AUTO: 8.63 K/UL — SIGNIFICANT CHANGE UP (ref 3.8–10.5)

## 2020-07-25 PROCEDURE — 99053 MED SERV 10PM-8AM 24 HR FAC: CPT

## 2020-07-25 PROCEDURE — 99223 1ST HOSP IP/OBS HIGH 75: CPT

## 2020-07-25 PROCEDURE — 82553 CREATINE MB FRACTION: CPT

## 2020-07-25 PROCEDURE — 36415 COLL VENOUS BLD VENIPUNCTURE: CPT

## 2020-07-25 PROCEDURE — 82550 ASSAY OF CK (CPK): CPT

## 2020-07-25 PROCEDURE — 84443 ASSAY THYROID STIM HORMONE: CPT

## 2020-07-25 PROCEDURE — 85027 COMPLETE CBC AUTOMATED: CPT

## 2020-07-25 PROCEDURE — 86769 SARS-COV-2 COVID-19 ANTIBODY: CPT

## 2020-07-25 PROCEDURE — 99285 EMERGENCY DEPT VISIT HI MDM: CPT

## 2020-07-25 PROCEDURE — 99285 EMERGENCY DEPT VISIT HI MDM: CPT | Mod: 25

## 2020-07-25 PROCEDURE — 87635 SARS-COV-2 COVID-19 AMP PRB: CPT

## 2020-07-25 PROCEDURE — 94640 AIRWAY INHALATION TREATMENT: CPT

## 2020-07-25 PROCEDURE — 83880 ASSAY OF NATRIURETIC PEPTIDE: CPT

## 2020-07-25 PROCEDURE — 80048 BASIC METABOLIC PNL TOTAL CA: CPT

## 2020-07-25 PROCEDURE — 84484 ASSAY OF TROPONIN QUANT: CPT

## 2020-07-25 RX ORDER — DRONEDARONE 400 MG/1
400 TABLET, FILM COATED ORAL
Refills: 0 | Status: DISCONTINUED | OUTPATIENT
Start: 2020-07-25 | End: 2020-07-25

## 2020-07-25 RX ORDER — FLUTICASONE PROPIONATE AND SALMETEROL 50; 250 UG/1; UG/1
1 POWDER ORAL; RESPIRATORY (INHALATION)
Refills: 0 | Status: DISCONTINUED | OUTPATIENT
Start: 2020-07-25 | End: 2020-07-25

## 2020-07-25 RX ORDER — FUROSEMIDE 40 MG
40 TABLET ORAL DAILY
Refills: 0 | Status: DISCONTINUED | OUTPATIENT
Start: 2020-07-25 | End: 2020-07-25

## 2020-07-25 RX ORDER — SERTRALINE 25 MG/1
150 TABLET, FILM COATED ORAL DAILY
Refills: 0 | Status: DISCONTINUED | OUTPATIENT
Start: 2020-07-25 | End: 2020-07-25

## 2020-07-25 RX ORDER — SODIUM CHLORIDE 0.65 %
1 AEROSOL, SPRAY (ML) NASAL
Refills: 0 | Status: DISCONTINUED | OUTPATIENT
Start: 2020-07-25 | End: 2020-07-25

## 2020-07-25 RX ORDER — RIVAROXABAN 15 MG-20MG
15 KIT ORAL DAILY
Refills: 0 | Status: DISCONTINUED | OUTPATIENT
Start: 2020-07-25 | End: 2020-07-25

## 2020-07-25 RX ORDER — HEPARIN SODIUM 5000 [USP'U]/ML
5000 INJECTION INTRAVENOUS; SUBCUTANEOUS EVERY 8 HOURS
Refills: 0 | Status: DISCONTINUED | OUTPATIENT
Start: 2020-07-25 | End: 2020-07-25

## 2020-07-25 RX ORDER — RIVAROXABAN 15 MG-20MG
15 KIT ORAL
Refills: 0 | Status: DISCONTINUED | OUTPATIENT
Start: 2020-07-25 | End: 2020-07-25

## 2020-07-25 RX ORDER — MIRTAZAPINE 45 MG/1
15 TABLET, ORALLY DISINTEGRATING ORAL AT BEDTIME
Refills: 0 | Status: DISCONTINUED | OUTPATIENT
Start: 2020-07-25 | End: 2020-07-25

## 2020-07-25 RX ORDER — ALPRAZOLAM 0.25 MG
0.5 TABLET ORAL
Refills: 0 | Status: DISCONTINUED | OUTPATIENT
Start: 2020-07-25 | End: 2020-07-25

## 2020-07-25 RX ORDER — FUROSEMIDE 40 MG
40 TABLET ORAL
Qty: 0 | Refills: 0 | DISCHARGE
Start: 2020-07-25

## 2020-07-25 RX ORDER — BUDESONIDE AND FORMOTEROL FUMARATE DIHYDRATE 160; 4.5 UG/1; UG/1
2 AEROSOL RESPIRATORY (INHALATION) ONCE
Refills: 0 | Status: DISCONTINUED | OUTPATIENT
Start: 2020-07-25 | End: 2020-07-25

## 2020-07-25 RX ADMIN — SERTRALINE 150 MILLIGRAM(S): 25 TABLET, FILM COATED ORAL at 11:57

## 2020-07-25 RX ADMIN — DRONEDARONE 400 MILLIGRAM(S): 400 TABLET, FILM COATED ORAL at 05:42

## 2020-07-25 RX ADMIN — Medication 40 MILLIGRAM(S): at 05:42

## 2020-07-25 RX ADMIN — DRONEDARONE 400 MILLIGRAM(S): 400 TABLET, FILM COATED ORAL at 17:18

## 2020-07-25 RX ADMIN — Medication 30 MILLILITER(S): at 10:19

## 2020-07-25 RX ADMIN — FLUTICASONE PROPIONATE AND SALMETEROL 1 DOSE(S): 50; 250 POWDER ORAL; RESPIRATORY (INHALATION) at 17:22

## 2020-07-25 RX ADMIN — RIVAROXABAN 15 MILLIGRAM(S): KIT at 17:17

## 2020-07-25 NOTE — H&P ADULT - NSICDXPASTSURGICALHX_GEN_ALL_CORE_FT
PAST SURGICAL HISTORY:  Hip fracture, left     History of hip replacement, total, bilateral     History of knee replacement, total, left     S/P cataract surgery, left     S/P cataract surgery, right

## 2020-07-25 NOTE — H&P ADULT - PROBLEM SELECTOR PLAN 3
Severe Anxiety with spells of hyperventilation and Syncope- Advair prn, Xanax prn for anxiety. Continue with Zoloft and Remeron.

## 2020-07-25 NOTE — DISCHARGE NOTE NURSING/CASE MANAGEMENT/SOCIAL WORK - PATIENT PORTAL LINK FT
You can access the FollowMyHealth Patient Portal offered by [facility] by registering at the following website: [email or web address]. By joining [facility]’s FollowMyHealth portal, you will also be able to view your health information using other applications (apps) compatible with our system.

## 2020-07-25 NOTE — PHARMACOTHERAPY INTERVENTION NOTE - COMMENTS
Patient with a history of Afib currently on Xarelto 15 mg daily. Verified home dose with daughter - she takes Xarelto 15 mg in the evening. Discussed with Dr. Rubio to recommend increasing the dose to 20 mg since patient's egfr >50. MD aware and would like to continue Xarelto 15 mg for now and will monitor renal function to adjust dose if needed. Recommended changing current order to take with dinner for increased bioavailability. MD agreed.

## 2020-07-25 NOTE — CONSULT NOTE ADULT - ATTENDING COMMENTS
I personally saw and examined the patient in detail.  I have spoken to the above provider regarding the assessment and plan of care.  I reviewed the above assessment and plan of care, and agree.  I have made changes in the body of the note where appropriate.  Acute heart failure.  Unknown EF.  No history of valvular heart disease and no murmur.  Monitor Telemetry.  Iv diuretics.  Needs echocardiogram.

## 2020-07-25 NOTE — PHARMACOTHERAPY INTERVENTION NOTE - COMMENTS
Patient with a history of asthma using Advair 500/50 twice a day at home. Discussed with Dr. Rubio that daughter will be bringing in the inhaler later today because it is a non-formulary medication. Recommended ordering therapeutic equivalent dose of Symbicort until the daughter is able to bring in the home medication. MD agreed

## 2020-07-25 NOTE — H&P ADULT - HISTORY OF PRESENT ILLNESS
89 F with pmhx anxiety, 89 F with pmhx severe anxiety, episodes of hyperventilation and syncopal episodes from hyperventilation, atrial fibrillation on Xarelto, otherwise healthy, functional, ambulates with no support, rode a stationary byke with no shortness of breath 2 weeks ago p/w shortness of breath x 1 week. Patient was recently seen by her cards, pulm for a routine visit and blood work which came back normal. As per daughter patient noted to be weak, very anxious and short of breath x 1 week, progressively worsening. Denies chest pain/ palpitations.     Patient follows up with cards at [facility].     In the ed patient had cxr shows pulmonary vascular congestion, received Lasix 40 mg x 1 dose.

## 2020-07-25 NOTE — H&P ADULT - NSICDXPASTMEDICALHX_GEN_ALL_CORE_FT
PAST MEDICAL HISTORY:  Acute congestive heart failure, unspecified heart failure type     Anxiety     Asthma     Atrial fibrillation     Macular degeneration     Presbycusis of both ears

## 2020-07-25 NOTE — DISCHARGE NOTE PROVIDER - NSDCCPCAREPLAN_GEN_ALL_CORE_FT
PRINCIPAL DISCHARGE DIAGNOSIS  Diagnosis: Acute congestive heart failure, unspecified heart failure type  Assessment and Plan of Treatment: iv Lasix, cards follow up      SECONDARY DISCHARGE DIAGNOSES  Diagnosis: Atrial fibrillation, unspecified type  Assessment and Plan of Treatment: Atrial fibrillation, unspecified type rate controlled,  Xanax.

## 2020-07-25 NOTE — H&P ADULT - NEGATIVE MUSCULOSKELETAL SYMPTOMS
----- Message from RAVI Yanes no arthritis/no joint swelling/no myalgia/no muscle cramps/no neck pain/no arm pain L

## 2020-07-25 NOTE — DISCHARGE NOTE PROVIDER - PROVIDER TOKENS
FREE:[LAST:[[facility]],PHONE:[(   )    -],FAX:[(   )    -],ADDRESS:[[facility]]]

## 2020-07-25 NOTE — H&P ADULT - PROBLEM SELECTOR PLAN 1
Continue with iv Lasix, follow up Echo- ordered.   Monitor on tele, Is and Os.   Cards consult. Follow up BNP levels, TSH.

## 2020-07-25 NOTE — DISCHARGE NOTE PROVIDER - HOSPITAL COURSE
89 F with pmhx severe anxiety, episodes of hyperventilation and syncopal episodes from hyperventilation, atrial fibrillation on Xarelto, otherwise healthy, functional, ambulates with no support, rode a stationary byke with no shortness of breath 2 weeks ago p/w shortness of breath x 1 week. Patient was recently seen by her mike, pulm for a routine visit and blood work which came back normal. As per daughter patient noted to be weak, very anxious and short of breath x 1 week, progressively worsening. Denies chest pain/ palpitations.         Patient follows up with cards at [facility].         Patient was seen by mike, family requesting to be transferred to [facility].

## 2020-07-25 NOTE — DISCHARGE NOTE PROVIDER - NSDCHC_MEDRECSTATUS_GEN_ALL_CORE
Patient returned call and was informed of lab results. Patient verbalized understanding. Patient is wondering if Dr. Samreen Doyle spoke with patient's lyme specialist in [place] Dr. Samreen Doyle. Admission Reconciliation is Not Complete  Discharge Reconciliation is Completed

## 2020-07-25 NOTE — CONSULT NOTE ADULT - ASSESSMENT
89 year old female with past medical history of anxiety,vaso vagal syncope, atrial fibrillation on Xarelto presenting with shortness of breath In the ed patient had cxr shows pulmonary vascular congestion, received Lasix 40 mg x 1 dose.     Shortness of breath   BNP 6986  check chest xray  2decho pending  strict intake and output  troponin negative     anemia  work up as per primary     afib  thromboembolism ppx on xarelto po     Monitor and replete electrolytes. Keep K>4.0 and Mg>2.0.  Holli Michaels FNP-C  Cardiology NP  [facility] [phone] 89 year old female with past medical history of anxiety, vaso vagal syncope, atrial fibrillation on Xarelto presenting to [facility] with with shortness of breath In the ed patient had cxr shows pulmonary vascular congestion, received Lasix 40 mg x 1 dose now transferred to [place] with acute chf     Shortness of breath , acute chf  BNP 6986  check chest xray  hypervolemic on exam continue with lasix 40mg iv daily  2decho pending  reports unremarkable cath 5  years ago, will obtain office records on Monday   strict intake and output  troponin negative     anemia  work up as per primary     afib  thromboembolism ppx on xarelto po   continue with Multaq     Monitor and replete electrolytes. Keep K>4.0 and Mg>2.0.  Holli Michaels FNP-C  Cardiology NP  [facility] [phone] 89 year old female with past medical history of anxiety, vaso vagal syncope, atrial fibrillation on Xarelto presenting to [facility] with with shortness of breath In the ed patient had cxr shows pulmonary vascular congestion, received Lasix 40 mg x 1 dose now transferred to [place] with acute chf     Shortness of breath , acute chf  BNP 6986  check chest xray, EKG  hypervolemic on exam continue with lasix 40mg iv daily  2decho pending  reports unremarkable cath 5  years ago, will obtain office records on Monday   strict intake and output  troponin negative     anemia  work up as per primary     afib  thromboembolism ppx on xarelto po   continue with Multaq     Monitor and replete electrolytes. Keep K>4.0 and Mg>2.0.  Holli Michaels FNP-C  Cardiology NP  [facility] [phone] 89 year old female with past medical history of anxiety, vaso vagal syncope, atrial fibrillation on Xarelto presenting to [facility] with with shortness of breath In the ed patient had cxr shows pulmonary vascular congestion, received Lasix 40 mg x 1 dose now transferred to [place] with acute chf     Shortness of breath , acute chf  BNP 6986  check chest xray, EKG  hypervolemic on exam continue with lasix 40mg iv daily  Reevaluate volume status tomorrow.  may need more aggressive diuresis.  2decho pending  reports unremarkable cath 5  years ago, will obtain office records on Monday   strict intake and output  troponin negative     anemia  work up as per primary     afib  thromboembolism ppx on xarelto po   continue with Multaq   If has new LV dysfunction may need to reconsider Multaq    Monitor and replete electrolytes. Keep K>4.0 and Mg>2.0.  Holli Michaels FNP-C  Cardiology NP  [facility] [phone]

## 2020-07-25 NOTE — DISCHARGE NOTE PROVIDER - CARE PROVIDER_API CALL
[facility],   [facility]  Phone: (   )    -  Fax: (   )    -  Follow Up Time:

## 2020-07-25 NOTE — CONSULT NOTE ADULT - SUBJECTIVE AND OBJECTIVE BOX
[facility] - Jaida Miguel, Douglas, Marc, Dyana, Erna Bradford  Office Number: [phone]    Initial Consult Note    CHIEF COMPLAINT: Patient is a 89y old  Female who presents with a chief complaint of     HPI:  89 year old female with past medical history of anxiety,vaso vagal syncope, atrial fibrillation on Xarelto presenting with shortness of breath In the ed patient had cxr shows pulmonary vascular congestion, received Lasix 40 mg x 1 dose.     PAST MEDICAL & SURGICAL HISTORY:  Macular degeneration  Presbycusis of both ears  Atrial fibrillation  Asthma  Anxiety  Acute congestive heart failure, unspecified heart failure type  History of hip replacement, total, bilateral  History of knee replacement, total, left  Hip fracture, left  S/P cataract surgery, right  S/P cataract surgery, left      SOCIAL HISTORY:  No tobacco, ethanol, or drug abuse.    FAMILY HISTORY:    No family history of acute MI or sudden cardiac death.    MEDICATIONS  (STANDING):  dronedarone 400 milliGRAM(s) Oral two times a day  furosemide   Injectable 40 milliGRAM(s) IV Push daily  mirtazapine 15 milliGRAM(s) Oral at bedtime  rivaroxaban 15 milliGRAM(s) Oral with dinner  sertraline 150 milliGRAM(s) Oral daily    MEDICATIONS  (PRN):  ALPRAZolam 0.5 milliGRAM(s) Oral two times a day PRN anxiety      Allergies    sulfamethoxazole (Unknown)    Intolerances        REVIEW OF SYSTEMS:    CONSTITUTIONAL: No weakness, fevers or chills  EYES/ENT: No visual changes;  No vertigo or throat pain   NECK: No pain or stiffness  RESPIRATORY: No cough, wheezing, hemoptysis; No shortness of breath  CARDIOVASCULAR: No chest pain or palpitations  GASTROINTESTINAL: No abdominal pain. No nausea, vomiting, or hematemesis; No diarrhea or constipation. No melena or hematochezia.  GENITOURINARY: No dysuria, frequency or hematuria  NEUROLOGICAL: No numbness or weakness  SKIN: No itching or rash  All other review of systems is negative unless indicated above    VITAL SIGNS:   Vital Signs Last 24 Hrs  T(C): 36.7 (25 Jul 2020 08:26), Max: 37.2 (25 Jul 2020 03:00)  T(F): 98 (25 Jul 2020 08:26), Max: 98.9 (25 Jul 2020 03:00)  HR: 79 (25 Jul 2020 08:26) (79 - 116)  BP: 110/60 (25 Jul 2020 08:26) (110/60 - 164/72)  BP(mean): 102 (25 Jul 2020 00:20) (102 - 102)  RR: 19 (25 Jul 2020 08:26) (18 - 22)  SpO2: 93% (25 Jul 2020 08:26) (91% - 98%)    I&O's Summary    24 Jul 2020 07:01  -  25 Jul 2020 07:00  --------------------------------------------------------  IN: 0 mL / OUT: 1200 mL / NET: -1200 mL        On Exam:    Constitutional: NAD, alert and oriented x 3  Lungs:  Non-labored, breath sounds are clear bilaterally, No wheezing, rales or rhonchi  Cardiovascular: RRR.  S1 and S2 positive.  No murmurs, rubs, gallops or clicks  Gastrointestinal: Bowel Sounds present, soft, nontender.   Lymph: No peripheral edema. No cervical lymphadenopathy.  Neurological: Alert, no focal deficits  Skin: No rashes or ulcers   Psych:  Mood & affect appropriate.    LABS: All Labs Reviewed:                        9.9    8.63  )-----------( 453      ( 25 Jul 2020 07:08 )             30.5     25 Jul 2020 00:27    142    |  108    |  19     ----------------------------<  92     3.7     |  26     |  0.86     Ca    8.4        25 Jul 2020 00:27        CARDIAC MARKERS ( 25 Jul 2020 00:27 )  <.015 ng/mL / x     / 49 U/L / x     / 1.3 ng/mL      Blood Culture:   07-25 @ 00:27  Pro Bnp [id number]    07-25 @ 00:27  TSH: 3.11 [facility] - Jaida Miguel, Douglas, Marc, Dyana, Erna Bradford  Office Number: [phone]    Initial Consult Note    CHIEF COMPLAINT: Patient is a 89y old  Female who presents with a chief complaint of     HPI:  89 year old female with past medical history of anxiety, vaso vagal syncope, atrial fibrillation on Xarelto presenting with shortness of breath In the ed patient had cxr shows pulmonary vascular congestion, received Lasix 40 mg x 1 dose.   Reports three day history of dyspnea on exertion and orthopnea. Denies chest pain palpitations or dizziness. Reports she was seen by her primary cardiologist one week ago Dr Ballard for routine follow up and was well at that time. Reports having angiogram at [facility] five years ago with " normal arteries".  states she is very active at home. no recent illness, no fever chills, cough   + abdominal cramping, no nausea, no vomiting, no diarrhea   Vital Signs Last 24 Hrs  T(C): 36.7 (25 Jul 2020 08:26), Max: 37.2 (25 Jul 2020 03:00)  T(F): 98 (25 Jul 2020 08:26), Max: 98.9 (25 Jul 2020 03:00)  HR: 79 (25 Jul 2020 08:26) (79 - 116)  BP: 110/60 (25 Jul 2020 08:26) (110/60 - 164/72)  BP(mean): 102 (25 Jul 2020 00:20) (102 - 102)  RR: 19 (25 Jul 2020 08:26) (18 - 22)  SpO2: 93% (25 Jul 2020 08:26) (91% - 98%)    PAST MEDICAL & SURGICAL HISTORY:  Macular degeneration  Presbycusis of both ears  Atrial fibrillation  Asthma  Anxiety  Acute congestive heart failure, unspecified heart failure type  History of hip replacement, total, bilateral  History of knee replacement, total, left  Hip fracture, left  S/P cataract surgery, right  S/P cataract surgery, left      SOCIAL HISTORY:  No tobacco, ethanol, or drug abuse.    FAMILY HISTORY:    No family history of acute MI or sudden cardiac death.    MEDICATIONS  (STANDING):  dronedarone 400 milliGRAM(s) Oral two times a day  furosemide   Injectable 40 milliGRAM(s) IV Push daily  mirtazapine 15 milliGRAM(s) Oral at bedtime  rivaroxaban 15 milliGRAM(s) Oral with dinner  sertraline 150 milliGRAM(s) Oral daily    MEDICATIONS  (PRN):  ALPRAZolam 0.5 milliGRAM(s) Oral two times a day PRN anxiety      Allergies    sulfamethoxazole (Unknown)    Intolerances        REVIEW OF SYSTEMS:    CONSTITUTIONAL: No weakness, fevers or chills  EYES/ENT: No visual changes;  No vertigo or throat pain   NECK: No pain or stiffness  RESPIRATORY: No cough, wheezing, hemoptysis; + shortness of breath  CARDIOVASCULAR: No chest pain or palpitations  GASTROINTESTINAL: No abdominal pain. No nausea, vomiting, or hematemesis; No diarrhea or constipation. No melena or hematochezia.  GENITOURINARY: No dysuria, frequency or hematuria  NEUROLOGICAL: No numbness or weakness  SKIN: No itching or rash  All other review of systems is negative unless indicated above    VITAL SIGNS:   Vital Signs Last 24 Hrs  T(C): 36.7 (25 Jul 2020 08:26), Max: 37.2 (25 Jul 2020 03:00)  T(F): 98 (25 Jul 2020 08:26), Max: 98.9 (25 Jul 2020 03:00)  HR: 79 (25 Jul 2020 08:26) (79 - 116)  BP: 110/60 (25 Jul 2020 08:26) (110/60 - 164/72)  BP(mean): 102 (25 Jul 2020 00:20) (102 - 102)  RR: 19 (25 Jul 2020 08:26) (18 - 22)  SpO2: 93% (25 Jul 2020 08:26) (91% - 98%)    I&O's Summary    24 Jul 2020 07:01  -  25 Jul 2020 07:00  --------------------------------------------------------  IN: 0 mL / OUT: 1200 mL / NET: -1200 mL        On Exam:    Constitutional: NAD, alert and oriented x 3  Lungs:  Non-labored, + rales bilaterally   Cardiovascular: RRR.  S1 and S2 positive.  No murmurs, rubs, gallops or clicks  Gastrointestinal: Bowel Sounds present, soft, nontender.   Lymph: No peripheral edema. No cervical lymphadenopathy.  Neurological: Alert, no focal deficits  Skin: No rashes or ulcers   Psych:  Mood & affect appropriate.    LABS: All Labs Reviewed:                        9.9    8.63  )-----------( 453      ( 25 Jul 2020 07:08 )             30.5     25 Jul 2020 00:27    142    |  108    |  19     ----------------------------<  92     3.7     |  26     |  0.86     Ca    8.4        25 Jul 2020 00:27        CARDIAC MARKERS ( 25 Jul 2020 00:27 )  <.015 ng/mL / x     / 49 U/L / x     / 1.3 ng/mL      Blood Culture:   07-25 @ 00:27  Pro Bnp [id number]    07-25 @ 00:27  TSH: 3.11 [facility] - Jaida Miguel, Douglas, Marc, Dyana, Erna Bradford  Office Number: [phone]    Initial Consult Note    CHIEF COMPLAINT: Patient is a 89y old  Female who presents with a chief complaint of     HPI:  89 year old female with past medical history of anxiety, vaso vagal syncope, atrial fibrillation on Xarelto presenting with shortness of breath In the ed patient had cxr shows pulmonary vascular congestion, received Lasix 40 mg x 1 dose.   Reports three day history of dyspnea on exertion and orthopnea. Denies chest pain palpitations or dizziness. Reports she was seen by her primary cardiologist one week ago Dr Ballard for routine follow up and was well at that time. Reports having angiogram at [facility] five years ago with " normal arteries".  states she is very active at home. no recent illness, no fever chills, cough   + abdominal cramping, no nausea, no vomiting, no diarrhea   denies dietary indiscretion or   Vital Signs Last 24 Hrs  T(C): 36.7 (25 Jul 2020 08:26), Max: 37.2 (25 Jul 2020 03:00)  T(F): 98 (25 Jul 2020 08:26), Max: 98.9 (25 Jul 2020 03:00)  HR: 79 (25 Jul 2020 08:26) (79 - 116)  BP: 110/60 (25 Jul 2020 08:26) (110/60 - 164/72)  BP(mean): 102 (25 Jul 2020 00:20) (102 - 102)  RR: 19 (25 Jul 2020 08:26) (18 - 22)  SpO2: 93% (25 Jul 2020 08:26) (91% - 98%)    PAST MEDICAL & SURGICAL HISTORY:  Macular degeneration  Presbycusis of both ears  Atrial fibrillation  Asthma  Anxiety  Acute congestive heart failure, unspecified heart failure type  History of hip replacement, total, bilateral  History of knee replacement, total, left  Hip fracture, left  S/P cataract surgery, right  S/P cataract surgery, left      SOCIAL HISTORY:  No tobacco, ethanol, or drug abuse.    FAMILY HISTORY:    No family history of acute MI or sudden cardiac death.    MEDICATIONS  (STANDING):  dronedarone 400 milliGRAM(s) Oral two times a day  furosemide   Injectable 40 milliGRAM(s) IV Push daily  mirtazapine 15 milliGRAM(s) Oral at bedtime  rivaroxaban 15 milliGRAM(s) Oral with dinner  sertraline 150 milliGRAM(s) Oral daily    MEDICATIONS  (PRN):  ALPRAZolam 0.5 milliGRAM(s) Oral two times a day PRN anxiety      Allergies    sulfamethoxazole (Unknown)    Intolerances        REVIEW OF SYSTEMS:    CONSTITUTIONAL: No weakness, fevers or chills  EYES/ENT: No visual changes;  No vertigo or throat pain   NECK: No pain or stiffness  RESPIRATORY: No cough, wheezing, hemoptysis; + shortness of breath  CARDIOVASCULAR: No chest pain or palpitations  GASTROINTESTINAL: No abdominal pain. No nausea, vomiting, or hematemesis; No diarrhea or constipation. No melena or hematochezia.  GENITOURINARY: No dysuria, frequency or hematuria  NEUROLOGICAL: No numbness or weakness  SKIN: No itching or rash  All other review of systems is negative unless indicated above    VITAL SIGNS:   Vital Signs Last 24 Hrs  T(C): 36.7 (25 Jul 2020 08:26), Max: 37.2 (25 Jul 2020 03:00)  T(F): 98 (25 Jul 2020 08:26), Max: 98.9 (25 Jul 2020 03:00)  HR: 79 (25 Jul 2020 08:26) (79 - 116)  BP: 110/60 (25 Jul 2020 08:26) (110/60 - 164/72)  BP(mean): 102 (25 Jul 2020 00:20) (102 - 102)  RR: 19 (25 Jul 2020 08:26) (18 - 22)  SpO2: 93% (25 Jul 2020 08:26) (91% - 98%)    I&O's Summary    24 Jul 2020 07:01  -  25 Jul 2020 07:00  --------------------------------------------------------  IN: 0 mL / OUT: 1200 mL / NET: -1200 mL        On Exam:    Constitutional: NAD, alert and oriented x 3  Lungs:  Non-labored, + rales bilaterally   Cardiovascular: RRR.  S1 and S2 positive.  No murmurs, rubs, gallops or clicks  Gastrointestinal: Bowel Sounds present, soft, nontender.   Lymph: No peripheral edema. No cervical lymphadenopathy.  Neurological: Alert, no focal deficits  Skin: No rashes or ulcers   Psych:  Mood & affect appropriate.    LABS: All Labs Reviewed:                        9.9    8.63  )-----------( 453      ( 25 Jul 2020 07:08 )             30.5     25 Jul 2020 00:27    142    |  108    |  19     ----------------------------<  92     3.7     |  26     |  0.86     Ca    8.4        25 Jul 2020 00:27        CARDIAC MARKERS ( 25 Jul 2020 00:27 )  <.015 ng/mL / x     / 49 U/L / x     / 1.3 ng/mL      Blood Culture:   07-25 @ 00:27  Pro Bnp [id number]    07-25 @ 00:27  TSH: 3.11

## 2020-07-25 NOTE — DISCHARGE NOTE PROVIDER - NSDCMRMEDTOKEN_GEN_ALL_CORE_FT
Advair Diskus 500 mcg-50 mcg inhalation powder: 1 puff(s) inhaled 2 times a day  furosemide 100 mg/100 mL-0.9% intravenous solution: 40 milliliter(s) intravenous once a day  Multaq 400 mg oral tablet: 1 tab(s) orally 2 times a day  Remeron 15 mg oral tablet: 1 tab(s) orally once a day (at bedtime)  Xanax 0.25 mg oral tablet: 1 tab(s) orally 3 times a day, As Needed  Xarelto 15 mg oral tablet: 1 tab(s) orally once a day (in the evening)  Zoloft: 150 milligram(s) orally once a day

## 2020-07-25 NOTE — H&P ADULT - ASSESSMENT
89 F with pmhx severe anxiety, episodes of hyperventilation and syncopal episodes from hyperventilation, atrial fibrillation on Xarelto p/w CHF exacerbation.

## 2020-07-26 PROBLEM — Z96.641 PRESENCE OF RIGHT ARTIFICIAL HIP JOINT: Chronic | Status: ACTIVE | Noted: 2017-11-05

## 2020-07-26 PROBLEM — H91.13 PRESBYCUSIS, BILATERAL: Chronic | Status: ACTIVE | Noted: 2017-11-05

## 2020-07-26 PROBLEM — I48.91 UNSPECIFIED ATRIAL FIBRILLATION: Chronic | Status: ACTIVE | Noted: 2017-11-05

## 2020-07-26 PROBLEM — H35.30 UNSPECIFIED MACULAR DEGENERATION: Chronic | Status: ACTIVE | Noted: 2017-11-05

## 2020-07-26 PROBLEM — J45.909 UNSPECIFIED ASTHMA, UNCOMPLICATED: Chronic | Status: ACTIVE | Noted: 2017-11-05

## 2020-07-26 LAB
SARS-COV-2 IGG SERPL QL IA: NEGATIVE — SIGNIFICANT CHANGE UP
SARS-COV-2 IGM SERPL IA-ACNC: <3.8 AU/ML — SIGNIFICANT CHANGE UP

## 2020-08-13 NOTE — ED ADULT NURSE NOTE - FINAL NURSING ELECTRONIC SIGNATURE
I explained that the floaters will slowly fade over a period of months to years but will not likely disappear completely. They are usually well-tolerated. Intermittent flashes will fade over time as well and do not add worrisome significance. 24-Jul-2020 22:37

## 2020-09-19 ENCOUNTER — EMERGENCY (EMERGENCY)
Facility: HOSPITAL | Age: 85
LOS: 1 days | Discharge: HOME | End: 2020-09-19
Attending: EMERGENCY MEDICINE | Admitting: EMERGENCY MEDICINE
Payer: MEDICARE

## 2020-09-19 VITALS
DIASTOLIC BLOOD PRESSURE: 57 MMHG | TEMPERATURE: 98 F | RESPIRATION RATE: 18 BRPM | OXYGEN SATURATION: 97 % | HEART RATE: 63 BPM | SYSTOLIC BLOOD PRESSURE: 155 MMHG

## 2020-09-19 VITALS
DIASTOLIC BLOOD PRESSURE: 62 MMHG | HEART RATE: 73 BPM | RESPIRATION RATE: 18 BRPM | SYSTOLIC BLOOD PRESSURE: 160 MMHG | TEMPERATURE: 98 F | WEIGHT: 138.01 LBS | HEIGHT: 62 IN | OXYGEN SATURATION: 95 %

## 2020-09-19 DIAGNOSIS — Z96.652 PRESENCE OF LEFT ARTIFICIAL KNEE JOINT: Chronic | ICD-10-CM

## 2020-09-19 DIAGNOSIS — Z96.643 PRESENCE OF ARTIFICIAL HIP JOINT, BILATERAL: Chronic | ICD-10-CM

## 2020-09-19 DIAGNOSIS — Z98.42 CATARACT EXTRACTION STATUS, LEFT EYE: Chronic | ICD-10-CM

## 2020-09-19 DIAGNOSIS — Z98.41 CATARACT EXTRACTION STATUS, RIGHT EYE: Chronic | ICD-10-CM

## 2020-09-19 DIAGNOSIS — Z87.81 PERSONAL HISTORY OF (HEALED) TRAUMATIC FRACTURE: Chronic | ICD-10-CM

## 2020-09-19 DIAGNOSIS — S72.002A FRACTURE OF UNSPECIFIED PART OF NECK OF LEFT FEMUR, INITIAL ENCOUNTER FOR CLOSED FRACTURE: Chronic | ICD-10-CM

## 2020-09-19 DIAGNOSIS — Z96.641 PRESENCE OF RIGHT ARTIFICIAL HIP JOINT: Chronic | ICD-10-CM

## 2020-09-19 PROBLEM — F41.9 ANXIETY DISORDER, UNSPECIFIED: Chronic | Status: ACTIVE | Noted: 2020-07-24

## 2020-09-19 LAB
APPEARANCE UR: CLEAR — SIGNIFICANT CHANGE UP
BILIRUB UR-MCNC: NEGATIVE — SIGNIFICANT CHANGE UP
COLOR SPEC: YELLOW — SIGNIFICANT CHANGE UP
DIFF PNL FLD: NEGATIVE — SIGNIFICANT CHANGE UP
GLUCOSE UR QL: NEGATIVE MG/DL — SIGNIFICANT CHANGE UP
KETONES UR-MCNC: NEGATIVE — SIGNIFICANT CHANGE UP
LEUKOCYTE ESTERASE UR-ACNC: ABNORMAL
NITRITE UR-MCNC: NEGATIVE — SIGNIFICANT CHANGE UP
PH UR: 7 — SIGNIFICANT CHANGE UP (ref 5–8)
PROT UR-MCNC: NEGATIVE MG/DL — SIGNIFICANT CHANGE UP
SP GR SPEC: 1 — LOW (ref 1.01–1.02)
UROBILINOGEN FLD QL: NEGATIVE MG/DL — SIGNIFICANT CHANGE UP

## 2020-09-19 PROCEDURE — 70450 CT HEAD/BRAIN W/O DYE: CPT | Mod: 26

## 2020-09-19 PROCEDURE — 81001 URINALYSIS AUTO W/SCOPE: CPT

## 2020-09-19 PROCEDURE — 73030 X-RAY EXAM OF SHOULDER: CPT | Mod: 26,50

## 2020-09-19 PROCEDURE — 72125 CT NECK SPINE W/O DYE: CPT | Mod: 26

## 2020-09-19 PROCEDURE — 99284 EMERGENCY DEPT VISIT MOD MDM: CPT | Mod: 25

## 2020-09-19 PROCEDURE — 70450 CT HEAD/BRAIN W/O DYE: CPT

## 2020-09-19 PROCEDURE — 87086 URINE CULTURE/COLONY COUNT: CPT

## 2020-09-19 PROCEDURE — 99284 EMERGENCY DEPT VISIT MOD MDM: CPT

## 2020-09-19 PROCEDURE — 73030 X-RAY EXAM OF SHOULDER: CPT

## 2020-09-19 PROCEDURE — 72125 CT NECK SPINE W/O DYE: CPT

## 2020-09-19 RX ORDER — LORATADINE 10 MG/1
0 TABLET ORAL
Qty: 0 | Refills: 0 | DISCHARGE

## 2020-09-19 RX ORDER — HYDROXYZINE HCL 10 MG
25 TABLET ORAL ONCE
Refills: 0 | Status: COMPLETED | OUTPATIENT
Start: 2020-09-19 | End: 2020-09-19

## 2020-09-19 RX ORDER — ALPRAZOLAM 0.25 MG
0 TABLET ORAL
Qty: 0 | Refills: 0 | DISCHARGE

## 2020-09-19 RX ORDER — SERTRALINE 25 MG/1
0 TABLET, FILM COATED ORAL
Qty: 0 | Refills: 0 | DISCHARGE

## 2020-09-19 RX ADMIN — Medication 25 MILLIGRAM(S): at 11:12

## 2020-09-19 NOTE — ED PROVIDER NOTE - CARE PLAN
Principal Discharge DX:	Head injury  Secondary Diagnosis:	Shoulder pain  Secondary Diagnosis:	Fall

## 2020-09-19 NOTE — ED PROVIDER NOTE - PROGRESS NOTE DETAILS
spoke with pt daughter, the emergency contact via telephone.  Results reviewed, copy provided all questions answered.  Pt stable for discharge home will come to

## 2020-09-19 NOTE — ED PROVIDER NOTE - PHYSICAL EXAMINATION
diffuse cervical TTP  no midline T/L scattered open abrasions noted to back secondary to itching   no TTP to chest wall   FROM of UE with diffuse TTP to bilateral shoulders   no TTP to bilateral hip regions

## 2020-09-19 NOTE — ED PROVIDER NOTE - NSFOLLOWUPINSTRUCTIONS_ED_ALL_ED_FT
Return to the ED for any new or worsening symptoms  Take your medication as prescribed  Make sure to apply lotion to your skin and use only perfume free dye free alternatives  Advance activity as tolerated  Use your walker when ambulating   Follow up with your PMD in 2-3 days for a recheck

## 2020-09-19 NOTE — ED PROVIDER NOTE - PATIENT PORTAL LINK FT
You can access the FollowMyHealth Patient Portal offered by [facility] by registering at the following website: [email or web address]. By joining [facility]’s FollowMyHealth portal, you will also be able to view your health information using other applications (apps) compatible with our system.

## 2020-09-19 NOTE — ED PROVIDER NOTE - CARE PROVIDER_API CALL
Beatriz Griffin  FAMILY MEDICINE  [address], [address]  [place] [address]  Phone: [phone]  Fax: [phone]  Follow Up Time:

## 2020-09-19 NOTE — ED PROVIDER NOTE - CLINICAL SUMMARY MEDICAL DECISION MAKING FREE TEXT BOX
Pt is a 89 yo female who presents to the ED with a cc of fall.  PMHx of Anxiety, asthma, A-fib on Xarelto, History of hip replacement, total, right    Macular degeneration (senile) of retina, Presbycusis of both ears  hearing aid.  Pt reports that she recently was admitted to [place] for "fluid on the lungs."  She was treated with a diuretic and was also treated for pneumonia.  Pt reports that she improved and was sent to rehab and has since returned home.  She reports that since that admission she has required a walker to ambulate.  Today she reports that she ambulated to the restroom with her walker but when she was attempting to sit down on the toilet she lost her balance and fell backwards striking her head and back on the ground.  Denies LOC.  Was able to stand and sit after with the help of her aide.  Denies N/V, CP, SOB, abd pain.  Does report diffuse headache, bilateral shoulder pain, and neck pain.  Denies numbness or tingling in her ext. Pt was concerned because she is on Xarelto Pt is a 89 yo female who presents to the ED with a cc of fall.  PMHx of Anxiety, asthma, A-fib on Xarelto, History of hip replacement, total, right    Macular degeneration (senile) of retina, Presbycusis of both ears  hearing aid.  Pt reports that she recently was admitted to [place] for "fluid on the lungs."  She was treated with a diuretic and was also treated for pneumonia.  Pt reports that she improved and was sent to rehab and has since returned home.  She reports that since that admission she has required a walker to ambulate.  Today she reports that she ambulated to the restroom with her walker but when she was attempting to sit down on the toilet she lost her balance and fell backwards striking her head and back on the ground.  Denies LOC.  Was able to stand and sit after with the help of her aide.  Denies N/V, CP, SOB, abd pain.  Does report diffuse headache, bilateral shoulder pain, and neck pain.  Denies numbness or tingling in her ext. Pt was concerned because she is on Xarelto.  Pt with what appears to be mechanical fall on AC.  C/o head and shoulder pain.  Will obtain CT head/cervical spine and obtain x-rays of shoulder will monitor.  Will check UA to exclude underlying infection

## 2020-09-19 NOTE — ED PROVIDER NOTE - OBJECTIVE STATEMENT
Pt is a 89 yo female who presents to the ED with a cc of fall.  PMHx of Anxiety, asthma, A-fib on Xarelto, History of hip replacement, total, right    Macular degeneration (senile) of retina, Presbycusis of both ears  hearing aid.  Pt reports that she recently was admitted to [place] for "fluid on the lungs."  She was treated with a diuretic and was also treated for pneumonia.  Pt reports that she improved and was sent to rehab and has since returned home.  She reports that since that admission she has required a walker to ambulate.  Today she reports that she ambulated to the restroom with her walker but when she was attempting to sit down on the toilet she lost her balance and fell backwards striking her head and back on the ground.  Denies LOC.  Was able to stand and sit after with the help of her aide.  Denies N/V, CP, SOB, abd pain.  Does report diffuse headache, bilateral shoulder pain, and neck pain.  Denies numbness or tingling in her ext. Pt was concerned because she is on Xarelto

## 2020-09-19 NOTE — ED ADULT NURSE NOTE - OBJECTIVE STATEMENT
as per patient, patient tripped and fall from sitting position, hit her head and back and c/o pain, Pt is in no acute distress. resting in bed, no bleeding or n/v at this time, no bruising noted, pending MD's eval, will continue to monitor.

## 2020-09-19 NOTE — ED ADULT NURSE NOTE - NSIMPLEMENTINTERV_GEN_ALL_ED
Implemented All Fall with Harm Risk Interventions:  [place] to call system. Call bell, personal items and telephone within reach. Instruct patient to call for assistance. Room bathroom lighting operational. Non-slip footwear when patient is off stretcher. Physically safe environment: no spills, clutter or unnecessary equipment. Stretcher in lowest position, wheels locked, appropriate side rails in place. Provide visual cue, wrist band, yellow gown, etc. Monitor gait and stability. Monitor for mental status changes and reorient to person, place, and time. Review medications for side effects contributing to fall risk. Reinforce activity limits and safety measures with patient and family. Provide visual clues: red socks.

## 2020-09-20 LAB
CULTURE RESULTS: SIGNIFICANT CHANGE UP
SPECIMEN SOURCE: SIGNIFICANT CHANGE UP

## 2020-09-22 NOTE — ED POST DISCHARGE NOTE - DETAILS
Discussed with patient, denies  complaints. reports no fevers and feeling well. pt advised of urine culture findings, advised to follow up with PCP for repeat testing.

## 2020-10-08 NOTE — ED ADULT NURSE NOTE - ISOLATION TYPE:
None Cephalexin Counseling: I counseled the patient regarding use of cephalexin as an antibiotic for prophylactic and/or therapeutic purposes. Cephalexin (commonly prescribed under brand name Keflex) is a cephalosporin antibiotic which is active against numerous classes of bacteria, including most skin bacteria. Side effects may include nausea, diarrhea, gastrointestinal upset, rash, hives, yeast infections, and in rare cases, hepatitis, kidney disease, seizures, fever, confusion, neurologic symptoms, and others. Patients with severe allergies to penicillin medications are cautioned that there is about a 10% incidence of cross-reactivity with cephalosporins. When possible, patients with penicillin allergies should use alternatives to cephalosporins for antibiotic therapy.

## 2020-11-16 NOTE — ED ADULT NURSE NOTE - NS ED NURSE RECORD ANOTHER VITAL SIGN
Pt. Assessed. No signs or symptoms of shortness of breath, fatigue, chest pain or edema noted. Weight down 4.5 pounds at 197.8 lbs. Reviewed current list of patient's allergies and medication; updated the Electronic Medical Record.  Pt. Stated,\"I lost 15 p Yes

## 2021-11-09 NOTE — ED ADULT TRIAGE NOTE - NS ED TRIAGE AVPU SCALE
Doctor's office
Alert-The patient is alert, awake and responds to voice. The patient is oriented to time, place, and person. The triage nurse is able to obtain subjective information.

## 2022-03-01 NOTE — H&P ADULT - RALES
General: appears stated age, acting appropriately  Skin: normal color for race, no rash  Head: normocephalic, atraumatic  Eyes: clear conjunctiva, EOMI  ENMT: airway patent, no nasal discharge, TMs clear b/l   Cardiovascular: normal rate, normal rhythm, S1/S2  Pulmonary: clear to auscultation bilaterally, no rales, rhonchi, or wheeze  Abdomen: soft, mild RLQ tenderness, negative Rovsing's, negative rebound, no guarding  : male external genitalia, cremasteric reflex intact b/l, nontender testicles   Musculoskeletal: moving extremities well, no deformity, active in room   Neuro: alert and interactive, no focal neuro deficits
lower L/lower R

## 2022-04-12 RX ORDER — LORATADINE 10 MG/1
0 TABLET ORAL
Qty: 0 | Refills: 0 | DISCHARGE

## 2022-04-12 RX ORDER — ALPRAZOLAM 0.25 MG
1 TABLET ORAL
Qty: 0 | Refills: 0 | DISCHARGE

## 2022-04-12 RX ORDER — SERTRALINE 25 MG/1
0 TABLET, FILM COATED ORAL
Qty: 0 | Refills: 0 | DISCHARGE

## 2022-04-12 RX ORDER — SERTRALINE 25 MG/1
150 TABLET, FILM COATED ORAL
Qty: 0 | Refills: 0 | DISCHARGE

## 2022-04-12 RX ORDER — FLUTICASONE PROPIONATE 50 MCG
1 SPRAY, SUSPENSION NASAL
Qty: 0 | Refills: 0 | DISCHARGE

## 2022-04-12 RX ORDER — LORATADINE 10 MG/1
1 TABLET ORAL
Qty: 0 | Refills: 0 | DISCHARGE

## 2022-10-20 NOTE — ED ADULT NURSE NOTE - CAS TRG GEN SKIN COLOR
Assessment and Summary of Care:    Patient's current functional status before discharge is as follows  Strength: RLE 3+/5, 4+/5 LLE  ROM:  Right Hip Flexion to 75 degs  Bed Mobility: Ind supine to sit transfers performed on couch pt declined bed mobility  Transfers: Ind sit to stand transfers from multiple surfaces in home   Gait/WC mobility: Pt amb 200ft on even surfaces no AD with good nemo, decreased hip flexion  Stairs:  Mod I stair negotiation with use of rail and SPC  Special Tests: No Special Test Performed   Recommendations: Recommend pt continue to perform HEP as tolerated increased, may benefit from continuing PT in outpatient setting Normal for race

## 2023-04-29 NOTE — ED PROVIDER NOTE - EKG ADDITIONAL QUESTION - PERFORMED INDEPENDENT VISUALIZATION
Encounter Date: 2023    SCRIBE #1 NOTE: I, Duke Marco, am scribing for, and in the presence of,  Mary Jane Mccain MD.     History     Chief Complaint   Patient presents with    Flank Pain     C/o right flank pain 10/10 radiates to back that began today. -urinary symptoms. Denies any other complaints. VSS     Time seen by provider: 7:42 PM    This is a 29 y.o. female who presents with complaint of right sided flank pain onset earlier today. Denies any nausea, vomiting, urinary symptoms, diarrhea, constipation, myalgias, fever, or chills. Patient explains that she was walking into work earlier today when she began to experiencing right sided flank pain, and she denies any recent trauma. When she told her manager, she was informed to come in to the ED. She notes that bending over and squatting has helped to alleviate the pain, but she does not state any other modifying factors. Here in the ED, she took tylenol which helped alleviate some of the pain and she currently rates it a 5 out of 10. This is the extent of the patient's complaints at this time.    The history is provided by the patient.   Review of patient's allergies indicates:  No Known Allergies  Past Medical History:   Diagnosis Date    HIV (human immunodeficiency virus infection)      Past Surgical History:   Procedure Laterality Date     SECTION, LOW TRANSVERSE      miscarriage        Family History   Problem Relation Age of Onset    Breast cancer Neg Hx     Colon cancer Neg Hx     Ovarian cancer Neg Hx      Social History     Tobacco Use    Smoking status: Never    Smokeless tobacco: Never   Substance Use Topics    Alcohol use: No    Drug use: No     Review of Systems   Constitutional:  Negative for chills and fever.   HENT:  Negative for sore throat.    Respiratory:  Negative for shortness of breath.    Cardiovascular:  Negative for chest pain.   Gastrointestinal:  Negative for constipation, diarrhea, nausea and vomiting.   Genitourinary:   Positive for flank pain. Negative for difficulty urinating, dysuria, frequency and urgency.   Musculoskeletal:  Negative for back pain and myalgias.   Skin:  Negative for rash.   Neurological:  Negative for weakness.   Hematological:  Does not bruise/bleed easily.     Physical Exam     Initial Vitals [04/28/23 1436]   BP Pulse Resp Temp SpO2   133/73 70 18 98.7 °F (37.1 °C) 100 %      MAP       --         Physical Exam    Constitutional: She appears well-developed and well-nourished. She does not have a sickly appearance. No distress.   HENT:   Head: Normocephalic and atraumatic.   Right Ear: External ear normal.   Left Ear: External ear normal.   Eyes: Conjunctivae, EOM and lids are normal. Right eye exhibits no discharge. Left eye exhibits no discharge. Right conjunctiva is not injected. Right conjunctiva has no hemorrhage. Left conjunctiva is not injected. Left conjunctiva has no hemorrhage. No scleral icterus.   Neck: Phonation normal. No stridor present. No tracheal deviation present.   Normal range of motion.  Cardiovascular:  Normal rate, regular rhythm and normal heart sounds.     Exam reveals no friction rub.       No murmur heard.  Pulses:       Radial pulses are 2+ on the right side and 2+ on the left side.        Dorsalis pedis pulses are 2+ on the right side and 2+ on the left side.   Abdominal: Abdomen is soft. Bowel sounds are normal. There is no abdominal tenderness. There is no rebound and no guarding.   Musculoskeletal:      Cervical back: Normal range of motion.      Comments: Right sacral tenderness to palpation. Full ROM.      Neurological: She is alert and oriented to person, place, and time. She has normal strength. GCS eye subscore is 4. GCS verbal subscore is 5. GCS motor subscore is 6.   Gait normal. Strength and sensation intact.        Skin: Skin is warm.   Psychiatric: She has a normal mood and affect. Her speech is normal and behavior is normal. Judgment and thought content normal.  Cognition and memory are normal.       ED Course   Procedures  Labs Reviewed   COMPREHENSIVE METABOLIC PANEL - Abnormal; Notable for the following components:       Result Value    Alkaline Phosphatase 50 (*)     All other components within normal limits   CBC W/ AUTO DIFFERENTIAL - Abnormal; Notable for the following components:    Hemoglobin 9.5 (*)     Hematocrit 31.3 (*)     MCV 78 (*)     MCH 23.7 (*)     MCHC 30.4 (*)     RDW 15.9 (*)     MPV 8.7 (*)     Gran % 74.4 (*)     All other components within normal limits   URINALYSIS, REFLEX TO URINE CULTURE - Abnormal; Notable for the following components:    Specific Gravity, UA >1.030 (*)     Protein, UA 1+ (*)     Occult Blood UA 2+ (*)     Urobilinogen, UA 2.0-3.0 (*)     All other components within normal limits    Narrative:     Specimen Source->Urine   URINALYSIS MICROSCOPIC - Abnormal; Notable for the following components:    RBC, UA 17 (*)     Bacteria Moderate (*)     All other components within normal limits    Narrative:     Specimen Source->Urine   POCT URINE PREGNANCY          Imaging Results    None          Medications   acetaminophen tablet 1,000 mg (1,000 mg Oral Given 4/28/23 1929)   ondansetron injection 4 mg (4 mg Intravenous Given 4/28/23 1935)   orphenadrine injection 60 mg (60 mg Intramuscular Given 4/28/23 2010)     Medical Decision Making:   History:   Old Medical Records: I decided to obtain old medical records.  Clinical Tests:   Lab Tests: Ordered and Reviewed        Scribe Attestation:   Scribe #1: I performed the above scribed service and the documentation accurately describes the services I performed. I attest to the accuracy of the note.      ED Course as of 04/29/23 0018   Fri Apr 28, 2023 2001 29-year-old healthy female presents complaining of focal atraumatic right lower back pain that began suddenly today while walking.  She denies any other associated symptoms or previous similar pain.  On exam she has tenderness over the  sacral region on the right.  The remainder of her physical exam is unremarkable.  She has no signs or symptoms concerning cord compression/cauda equina or UTI.  Suspicion for a kidney stone is low given the location of her pain.  She does have blood in her urinalysis but she also states that her menstrual cycle started today.  CBC consistent with anemia which is stable compared to previous.  CMP without significant abnormalities.  Pain is currently a 5/10 after Tylenol.  Suspect musculoskeletal etiology at this time.  Will treat with Lidoderm patches and NSAIDs.  Patient was also counseled supportive care for home and given indications for seeking immediate re-evaluation. [AA]      ED Course User Index  [AA] Mary Jane Mccain MD          Physician Attestation for Scribe: I, Mary Jane Mccain , reviewed documentation as scribed in my presence, which is both accurate and complete.       Clinical Impression:   Final diagnoses:  [M54.50] Acute right-sided low back pain without sciatica (Primary)        ED Disposition Condition    Discharge Stable          ED Prescriptions       Medication Sig Dispense Start Date End Date Auth. Provider    ibuprofen (ADVIL,MOTRIN) 800 MG tablet Take 1 tablet (800 mg total) by mouth every 8 (eight) hours as needed for Pain. 20 tablet 4/28/2023 -- Mary Jane Mccain MD    LIDOcaine (LIDODERM) 5 % Place 1 patch onto the skin once daily. Remove & Discard patch within 12 hours or as directed by MD for 7 days 7 patch 4/28/2023 5/5/2023 Mary Jane Mccain MD          Follow-up Information       Follow up With Specialties Details Why Contact Info    Primary care  Schedule an appointment as soon as possible for a visit in 1 week If symptoms persist despite these interventions     [facility] Emergency Medicine Go to  If symptoms worsen [address] [facility] [id number] [phone]             Mary Jane Mccain MD  04/29/23 0019     Yes

## 2023-08-30 NOTE — CONSULT NOTE ADULT - SUBJECTIVE AND OBJECTIVE BOX
Received pt from ED accompanied by daughter; AOx4, VSS; complains of pain on R hip and shoulder with PS of 9/10; 4 eyes skin assessment done Rachel RN; placed side rails up; oriented to room and call light use; MD notified of pt's arrival; plan of care continued   .

## 2023-09-14 ENCOUNTER — EMERGENCY (EMERGENCY)
Facility: HOSPITAL | Age: 88
LOS: 1 days | Discharge: HOME | End: 2023-09-14
Attending: EMERGENCY MEDICINE | Admitting: EMERGENCY MEDICINE
Payer: MEDICARE

## 2023-09-14 VITALS
TEMPERATURE: 98 F | OXYGEN SATURATION: 96 % | SYSTOLIC BLOOD PRESSURE: 148 MMHG | HEIGHT: 62 IN | DIASTOLIC BLOOD PRESSURE: 70 MMHG | WEIGHT: 145.95 LBS | HEART RATE: 67 BPM | RESPIRATION RATE: 16 BRPM

## 2023-09-14 VITALS
SYSTOLIC BLOOD PRESSURE: 160 MMHG | RESPIRATION RATE: 16 BRPM | HEART RATE: 69 BPM | DIASTOLIC BLOOD PRESSURE: 75 MMHG | TEMPERATURE: 98 F | OXYGEN SATURATION: 95 %

## 2023-09-14 DIAGNOSIS — Z96.652 PRESENCE OF LEFT ARTIFICIAL KNEE JOINT: Chronic | ICD-10-CM

## 2023-09-14 DIAGNOSIS — Z98.41 CATARACT EXTRACTION STATUS, RIGHT EYE: Chronic | ICD-10-CM

## 2023-09-14 DIAGNOSIS — S72.002A FRACTURE OF UNSPECIFIED PART OF NECK OF LEFT FEMUR, INITIAL ENCOUNTER FOR CLOSED FRACTURE: Chronic | ICD-10-CM

## 2023-09-14 DIAGNOSIS — Z96.641 PRESENCE OF RIGHT ARTIFICIAL HIP JOINT: Chronic | ICD-10-CM

## 2023-09-14 DIAGNOSIS — Z87.81 PERSONAL HISTORY OF (HEALED) TRAUMATIC FRACTURE: Chronic | ICD-10-CM

## 2023-09-14 DIAGNOSIS — Z98.42 CATARACT EXTRACTION STATUS, LEFT EYE: Chronic | ICD-10-CM

## 2023-09-14 DIAGNOSIS — Z96.643 PRESENCE OF ARTIFICIAL HIP JOINT, BILATERAL: Chronic | ICD-10-CM

## 2023-09-14 PROBLEM — H91.13 PRESBYCUSIS, BILATERAL: Chronic | Status: ACTIVE | Noted: 2020-07-24

## 2023-09-14 PROBLEM — J45.909 UNSPECIFIED ASTHMA, UNCOMPLICATED: Chronic | Status: ACTIVE | Noted: 2020-07-24

## 2023-09-14 PROBLEM — I48.91 UNSPECIFIED ATRIAL FIBRILLATION: Chronic | Status: ACTIVE | Noted: 2020-07-24

## 2023-09-14 PROBLEM — H35.30 UNSPECIFIED MACULAR DEGENERATION: Chronic | Status: ACTIVE | Noted: 2020-07-24

## 2023-09-14 PROBLEM — F41.9 ANXIETY DISORDER, UNSPECIFIED: Chronic | Status: ACTIVE | Noted: 2020-07-24

## 2023-09-14 PROBLEM — I50.9 HEART FAILURE, UNSPECIFIED: Chronic | Status: ACTIVE | Noted: 2020-07-24

## 2023-09-14 PROCEDURE — G1004: CPT

## 2023-09-14 PROCEDURE — 70450 CT HEAD/BRAIN W/O DYE: CPT | Mod: 26,MG

## 2023-09-14 PROCEDURE — 70450 CT HEAD/BRAIN W/O DYE: CPT | Mod: MG

## 2023-09-14 PROCEDURE — 99284 EMERGENCY DEPT VISIT MOD MDM: CPT | Mod: 25

## 2023-09-14 PROCEDURE — 72125 CT NECK SPINE W/O DYE: CPT | Mod: MG

## 2023-09-14 PROCEDURE — 72125 CT NECK SPINE W/O DYE: CPT | Mod: 26,MG

## 2023-09-14 PROCEDURE — 99284 EMERGENCY DEPT VISIT MOD MDM: CPT | Mod: FS

## 2023-09-14 NOTE — ED PROVIDER NOTE - OBJECTIVE STATEMENT
89 F on xarelto BIBEMS due to head injury s/p trip and fall while going to bathroom. No LOC. C/o pain to head and neck, c-collar applied. Denies back pain, arm/leg pain, cp, sob.

## 2023-09-14 NOTE — ED ADULT NURSE NOTE - OBJECTIVE STATEMENT
fell and hit head and neck pain no loc  on eliquis    patient stated she used bathroom and washed her hands, all of a sudden, she fell on her right side, she doesn't remember if she felt dizzy or not, comfort measure provided, callbell within reach, reinforced surrounding and plan of care, plan of care ongoing

## 2023-09-14 NOTE — ED PROVIDER NOTE - NSICDXPASTMEDICALHX_GEN_ALL_CORE_FT
PAST MEDICAL HISTORY:  Acute congestive heart failure, unspecified heart failure type     Anxiety     Anxiety     Asthma     Asthma     Atrial fibrillation     Atrial fibrillation     History of hip replacement, total, right     Macular degeneration     Macular degeneration (senile) of retina     Presbycusis of both ears hearing aid    Presbycusis of both ears

## 2023-09-14 NOTE — ED PROVIDER NOTE - CARE PROVIDER_API CALL
Beatriz Griffin  Family Medicine  [address], [address]  [place] [address]  Phone: [phone]  Fax: [phone]  Follow Up Time:

## 2023-09-14 NOTE — ED PROVIDER NOTE - CLINICAL SUMMARY MEDICAL DECISION MAKING FREE TEXT BOX
89 [facility] s/p trip and fall while going to bathroom. No LOC. C/o pain to head and neck, c-collar applied. Denies back pain, arm/leg pain, cp, sob.    Physical exam vital signs stable afebrile no distress.  HEENT exam there is a contusion skin tear on the right temporal scalp.  No suturable laceration.  No bony deformity.  Pupils equal round reactive to light extraocular muscles intact.  Neck is supple full range of motion intact nontender.  Heart and lungs normal.  Abdomen soft.  Extremities atraumatic full range of motion intact.  Neuro awake and alert no focal deficits DTRs +2.  Gait normal.  Impression is status post fall with scalp contusion rule out intracranial hemorrhage rule out skull fracture.  Plan is CT brain and cervical spine if negative discharged with head injury instructions.

## 2023-09-14 NOTE — ED PROVIDER NOTE - NS ED ATTENDING STATEMENT MOD
This was a shared visit with the SY[facility]. I reviewed and verified the documentation and independently performed the documented:

## 2023-09-14 NOTE — ED PROVIDER NOTE - PROGRESS NOTE DETAILS
Reevaluated patient with daughter at bedside.  Patient feeling well. Discussed the results of all diagnostic testing in ED and copies of all available reports given.   An opportunity to ask questions was provided.  Discussed the importance of prompt, close medical follow-up.  Patient will return with any changes, concerns or persistent/worsening symptoms.  Understanding of all instructions verbalized.

## 2023-09-14 NOTE — ED PROVIDER NOTE - NSICDXFAMILYHX_GEN_ALL_CORE_FT
FAMILY HISTORY:  Father  Still living? No  Family history of Hodgkin's disease, Age at diagnosis: 61-70    Mother  Still living? Unknown  Family history of pneumonia, Age at diagnosis: 21-30    Sibling  Still living? Unknown  Family history of pancreatic cancer, Age at diagnosis: 71-80

## 2023-09-14 NOTE — ED PROVIDER NOTE - NSICDXPASTSURGICALHX_GEN_ALL_CORE_FT
PAST SURGICAL HISTORY:  Hip fracture, left     History of hip fracture left hip, additional femoral rad for distal fx.    History of hip replacement, total, bilateral     History of hip replacement, total, right     History of knee replacement, total, left     History of knee replacement, total, left     S/P cataract surgery, left     S/P cataract surgery, right     Status post cataract surgery, left     Status post cataract surgery, right

## 2023-09-14 NOTE — ED PROVIDER NOTE - NSFOLLOWUPINSTRUCTIONS_ED_ALL_ED_FT
Head Injury, Adult    There are many types of head injuries. They can be as minor as a small bump. Some head injuries can be worse. Worse injuries include:  A strong hit to the head that shakes the brain back and forth, causing damage (concussion).  A bruise (contusion) of the brain. This means there is bleeding in the brain that can cause swelling.  A cracked skull (skull fracture).  Bleeding in the brain that gathers, gets thick (makes a clot), and forms a bump (hematoma).  Most problems from a head injury come in the first 24 hours. However, you may still have side effects up to 7–10 days after your injury. It is important to watch your condition for any changes. You may need to be watched in the emergency department or urgent care, or you may need to stay in the hospital.    What are the causes?  There are many possible causes of a head injury. A serious head injury may be caused by:  A car accident.  Bicycle or motorcycle accidents.  Sports injuries.  Falls.  Being hit by an object.  What are the signs or symptoms?  Symptoms of a head injury include a bruise, bump, or bleeding where the injury happened. Other physical symptoms may include:  Headache.  Feeling like you may vomit (nauseous) or vomiting.  Dizziness.  Blurred or double vision.  Being uncomfortable around bright lights or loud noises.  Shaking movements that you cannot control (seizures).  Feeling tired.  Trouble being woken up.  Fainting or loss of consciousness.  Mental or emotional symptoms may include:  Feeling grumpy or cranky.  Confusion and memory problems.  Having trouble paying attention or concentrating.  Changes in eating or sleeping habits.  Feeling worried or nervous (anxious).  Feeling sad (depressed).  How is this treated?  Treatment for this condition depends on how severe the injury is and the type of injury you have. The main goal is to prevent problems and to allow the brain time to heal.    Mild head injury    If you have a mild head injury, you may be sent home, and treatment may include:  Being watched. A responsible adult should stay with you for 24 hours after your injury and check on you often.  Physical rest.  Brain rest.  Pain medicines.  Severe head injury    If you have a severe head injury, treatment may include:  Being watched closely. This includes staying in the hospital.  Medicines to:  Help with pain.  Prevent seizures.  Help with brain swelling.  Protecting your airway and using a machine that helps you breathe (ventilator).  Treatments to watch for and manage swelling inside the brain.  Brain surgery. This may be needed to:  Remove a collection of blood or blood clots.  Stop the bleeding.  Remove a part of the skull. This allows room for the brain to swell.  Follow these instructions at home:  Activity    Rest.  Avoid activities that are hard or tiring.  Make sure you get enough sleep.  Let your brain rest. Do this by limiting activities that need a lot of thought or attention, such as:  Watching TV.  Playing memory games and puzzles.  Job-related work or homework.  Working on the computer, social media, and texting.  Avoid activities that could cause another head injury until your doctor says it is okay. This includes playing sports. Having another head injury, especially before the first one has healed, can be dangerous.  Ask your doctor when it is safe for you to go back to your normal activities, such as work or school. Ask your doctor for a step-by-step plan for slowly going back to your normal activities.  Ask your doctor when you can drive, ride a bicycle, or use heavy machinery. Do not do these activities if you are dizzy.  Lifestyle      Do not drink alcohol until your doctor says it is okay.  Do not use drugs.  If it is harder than usual to remember things, write them down.  If you are easily distracted, try to do one thing at a time.  Talk with family members or close friends when making important decisions.  Tell your friends, family, a trusted co-worker, and  about your injury, symptoms, and limits (restrictions). Have them watch for any problems that are new or getting worse.  General instructions    Take over-the-counter and prescription medicines only as told by your doctor.  Have someone stay with you for 24 hours after your head injury. This person should watch you for any changes in your symptoms and be ready to get help.  Keep all follow-up visits as told by your doctor. This is important.  How is this prevented?    Work on your balance and strength. This can help you avoid falls.  Wear a seat belt when you are in a moving vehicle.  Wear a helmet when you:  Ride a bicycle.  Ski.  Do any other sport or activity that has a risk of injury.  If you drink alcohol:  Limit how much you use to:  0–1 drink a day for nonpregnant women.  0–2 drinks a day for men.  Be aware of how much alcohol is in your drink. In the [place] one drink equals one 12 oz bottle of beer (355 mL), one 5 oz glass of wine (148 mL), or one 1½ oz glass of hard liquor (44 mL).  Make your home safer by:  Getting rid of clutter from the floors and stairs. This includes things that can make you trip.  Using grab bars in bathrooms and handrails by stairs.  Placing non-slip mats on floors and in bathtubs.  Putting more light in dim areas.  Where to find more information  Centers for Disease Control and Prevention: www.cdc.gov  Get help right away if:  You have:  A very bad headache that is not helped by medicine.  Trouble walking or weakness in your arms and legs.  Clear or bloody fluid coming from your nose or ears.  Changes in how you see (vision).  A seizure.  More confusion or more grumpy moods.  Your symptoms get worse.  You are sleepier than normal and have trouble staying awake.  You lose your balance.  The black centers of your eyes (pupils) change in size.  Your speech is slurred.  Your dizziness gets worse.  You vomit.  These symptoms may be an emergency. Do not wait to see if the symptoms will go away. Get medical help right away. Call your local emergency services (911 in the [place] Do not drive yourself to the hospital.    Summary  Head injuries can be as minor as a small bump. Some head injuries can be worse.  Treatment for this condition depends on how severe the injury is and the type of injury you have.  Have someone stay with you for 24 hours after your head injury.  Ask your doctor when it is safe for you to go back to your normal activities, such as work or school.  To prevent a head injury, wear a seat belt in a car, wear a helmet when you use a bicycle, limit your alcohol use, and make your home safer.  This information is not intended to replace advice given to you by your health care provider. Make sure you discuss any questions you have with your health care provider.

## 2023-09-26 NOTE — ED PROVIDER NOTE - NS_EDPROVIDERDISPOUSERTYPE_ED_A_ED
CARDIOLOGY FOLLOW UP APPOINTMENT: [facility] @1:30PM on 10/24/23 with SHEYLA Kraus and Dr. Mary Kate Alvarez Scribe Attestation (For Scribes USE Only)... Scribe Attestation (For Scribes USE Only).../Attending Attestation (For Attendings USE Only)...

## 2024-05-15 NOTE — ED ADULT NURSE NOTE - OTHER COMPLAINTS
[facility]  History and Physical      CHIEF COMPLAINT: Fatigue, dizziness    Reason for Admission: Dehydration    History Obtained From: Patient    PCP :  [facility] Dobbs MD    [address] [facility] [id number]      
Change in mental status, confusion, right side weakness, on Xarelto

## 2024-06-05 ENCOUNTER — INPATIENT (INPATIENT)
Facility: HOSPITAL | Age: 89
LOS: 0 days | Discharge: ANOTHER HOSPITAL | DRG: 204 | End: 2024-06-06
Attending: INTERNAL MEDICINE | Admitting: INTERNAL MEDICINE
Payer: MEDICARE

## 2024-06-05 VITALS
WEIGHT: 145.06 LBS | SYSTOLIC BLOOD PRESSURE: 119 MMHG | HEART RATE: 72 BPM | RESPIRATION RATE: 18 BRPM | TEMPERATURE: 98 F | HEIGHT: 63 IN | DIASTOLIC BLOOD PRESSURE: 71 MMHG | OXYGEN SATURATION: 94 %

## 2024-06-05 DIAGNOSIS — Z96.652 PRESENCE OF LEFT ARTIFICIAL KNEE JOINT: Chronic | ICD-10-CM

## 2024-06-05 DIAGNOSIS — Z96.643 PRESENCE OF ARTIFICIAL HIP JOINT, BILATERAL: Chronic | ICD-10-CM

## 2024-06-05 DIAGNOSIS — S72.002A FRACTURE OF UNSPECIFIED PART OF NECK OF LEFT FEMUR, INITIAL ENCOUNTER FOR CLOSED FRACTURE: Chronic | ICD-10-CM

## 2024-06-05 DIAGNOSIS — Z96.641 PRESENCE OF RIGHT ARTIFICIAL HIP JOINT: Chronic | ICD-10-CM

## 2024-06-05 DIAGNOSIS — Z87.81 PERSONAL HISTORY OF (HEALED) TRAUMATIC FRACTURE: Chronic | ICD-10-CM

## 2024-06-05 DIAGNOSIS — Z98.41 CATARACT EXTRACTION STATUS, RIGHT EYE: Chronic | ICD-10-CM

## 2024-06-05 DIAGNOSIS — R06.02 SHORTNESS OF BREATH: ICD-10-CM

## 2024-06-05 DIAGNOSIS — Z98.42 CATARACT EXTRACTION STATUS, LEFT EYE: Chronic | ICD-10-CM

## 2024-06-05 LAB
ALBUMIN SERPL ELPH-MCNC: 2.5 G/DL — LOW (ref 3.3–5)
ALP SERPL-CCNC: 56 U/L — SIGNIFICANT CHANGE UP (ref 30–120)
ALT FLD-CCNC: 17 U/L — SIGNIFICANT CHANGE UP (ref 10–60)
ANION GAP SERPL CALC-SCNC: 7 MMOL/L — SIGNIFICANT CHANGE UP (ref 5–17)
APPEARANCE UR: CLEAR — SIGNIFICANT CHANGE UP
APTT BLD: 37.2 SEC — HIGH (ref 24.5–35.6)
AST SERPL-CCNC: 15 U/L — SIGNIFICANT CHANGE UP (ref 10–40)
BASE EXCESS BLDV CALC-SCNC: 8.7 MMOL/L — HIGH (ref -2–3)
BASOPHILS # BLD AUTO: 0.01 K/UL — SIGNIFICANT CHANGE UP (ref 0–0.2)
BASOPHILS NFR BLD AUTO: 0.2 % — SIGNIFICANT CHANGE UP (ref 0–2)
BILIRUB SERPL-MCNC: 0.4 MG/DL — SIGNIFICANT CHANGE UP (ref 0.2–1.2)
BILIRUB UR-MCNC: NEGATIVE — SIGNIFICANT CHANGE UP
BUN SERPL-MCNC: 28 MG/DL — HIGH (ref 7–23)
CALCIUM SERPL-MCNC: 8.5 MG/DL — SIGNIFICANT CHANGE UP (ref 8.4–10.5)
CHLORIDE SERPL-SCNC: 106 MMOL/L — SIGNIFICANT CHANGE UP (ref 96–108)
CK SERPL-CCNC: 64 U/L — SIGNIFICANT CHANGE UP (ref 26–192)
CO2 SERPL-SCNC: 29 MMOL/L — SIGNIFICANT CHANGE UP (ref 22–31)
COLOR SPEC: YELLOW — SIGNIFICANT CHANGE UP
CREAT SERPL-MCNC: 1.25 MG/DL — SIGNIFICANT CHANGE UP (ref 0.5–1.3)
DIFF PNL FLD: NEGATIVE — SIGNIFICANT CHANGE UP
EGFR: 39 ML/MIN/1.73M2 — LOW
EOSINOPHIL # BLD AUTO: 0.2 K/UL — SIGNIFICANT CHANGE UP (ref 0–0.5)
EOSINOPHIL NFR BLD AUTO: 3.6 % — SIGNIFICANT CHANGE UP (ref 0–6)
GLUCOSE SERPL-MCNC: 101 MG/DL — HIGH (ref 70–99)
GLUCOSE UR QL: NEGATIVE MG/DL — SIGNIFICANT CHANGE UP
HCO3 BLDV-SCNC: 33 MMOL/L — HIGH (ref 22–29)
HCT VFR BLD CALC: 23.3 % — LOW (ref 34.5–45)
HGB BLD-MCNC: 7.3 G/DL — LOW (ref 11.5–15.5)
IMM GRANULOCYTES NFR BLD AUTO: 0.5 % — SIGNIFICANT CHANGE UP (ref 0–0.9)
INR BLD: 1.9 RATIO — HIGH (ref 0.85–1.18)
KETONES UR-MCNC: NEGATIVE MG/DL — SIGNIFICANT CHANGE UP
LACTATE SERPL-SCNC: 1.1 MMOL/L — SIGNIFICANT CHANGE UP (ref 0.7–2)
LEUKOCYTE ESTERASE UR-ACNC: NEGATIVE — SIGNIFICANT CHANGE UP
LYMPHOCYTES # BLD AUTO: 0.95 K/UL — LOW (ref 1–3.3)
LYMPHOCYTES # BLD AUTO: 17 % — SIGNIFICANT CHANGE UP (ref 13–44)
MCHC RBC-ENTMCNC: 27.4 PG — SIGNIFICANT CHANGE UP (ref 27–34)
MCHC RBC-ENTMCNC: 31.3 GM/DL — LOW (ref 32–36)
MCV RBC AUTO: 87.6 FL — SIGNIFICANT CHANGE UP (ref 80–100)
MONOCYTES # BLD AUTO: 0.67 K/UL — SIGNIFICANT CHANGE UP (ref 0–0.9)
MONOCYTES NFR BLD AUTO: 12 % — SIGNIFICANT CHANGE UP (ref 2–14)
NEUTROPHILS # BLD AUTO: 3.74 K/UL — SIGNIFICANT CHANGE UP (ref 1.8–7.4)
NEUTROPHILS NFR BLD AUTO: 66.7 % — SIGNIFICANT CHANGE UP (ref 43–77)
NITRITE UR-MCNC: NEGATIVE — SIGNIFICANT CHANGE UP
NRBC # BLD: 0 /100 WBCS — SIGNIFICANT CHANGE UP (ref 0–0)
NT-PROBNP SERPL-SCNC: 6218 PG/ML — HIGH (ref 0–450)
PCO2 BLDV: 47 MMHG — HIGH (ref 39–42)
PH BLDV: 7.45 — HIGH (ref 7.32–7.43)
PH UR: 5.5 — SIGNIFICANT CHANGE UP (ref 5–8)
PLATELET # BLD AUTO: 168 K/UL — SIGNIFICANT CHANGE UP (ref 150–400)
PO2 BLDV: <35 MMHG — SIGNIFICANT CHANGE UP (ref 25–45)
POTASSIUM SERPL-MCNC: 3.9 MMOL/L — SIGNIFICANT CHANGE UP (ref 3.5–5.3)
POTASSIUM SERPL-SCNC: 3.9 MMOL/L — SIGNIFICANT CHANGE UP (ref 3.5–5.3)
PROT SERPL-MCNC: 6.2 G/DL — SIGNIFICANT CHANGE UP (ref 6–8.3)
PROT UR-MCNC: SIGNIFICANT CHANGE UP MG/DL
PROTHROM AB SERPL-ACNC: 20.3 SEC — HIGH (ref 9.5–13)
RAPID RVP RESULT: DETECTED
RBC # BLD: 2.66 M/UL — LOW (ref 3.8–5.2)
RBC # FLD: 14.6 % — HIGH (ref 10.3–14.5)
RV+EV RNA SPEC QL NAA+PROBE: DETECTED
SAO2 % BLDV: 58.5 % — LOW (ref 67–88)
SARS-COV-2 RNA SPEC QL NAA+PROBE: SIGNIFICANT CHANGE UP
SODIUM SERPL-SCNC: 142 MMOL/L — SIGNIFICANT CHANGE UP (ref 135–145)
SP GR SPEC: 1.02 — SIGNIFICANT CHANGE UP (ref 1–1.03)
TROPONIN I, HIGH SENSITIVITY RESULT: 22.1 NG/L — SIGNIFICANT CHANGE UP
UROBILINOGEN FLD QL: 0.2 MG/DL — SIGNIFICANT CHANGE UP (ref 0.2–1)
WBC # BLD: 5.6 K/UL — SIGNIFICANT CHANGE UP (ref 3.8–10.5)
WBC # FLD AUTO: 5.6 K/UL — SIGNIFICANT CHANGE UP (ref 3.8–10.5)

## 2024-06-05 PROCEDURE — 71250 CT THORAX DX C-: CPT | Mod: 26

## 2024-06-05 PROCEDURE — 71045 X-RAY EXAM CHEST 1 VIEW: CPT | Mod: 26

## 2024-06-05 PROCEDURE — 99285 EMERGENCY DEPT VISIT HI MDM: CPT

## 2024-06-05 RX ORDER — BUDESONIDE AND FORMOTEROL FUMARATE DIHYDRATE 160; 4.5 UG/1; UG/1
2 AEROSOL RESPIRATORY (INHALATION)
Refills: 0 | Status: DISCONTINUED | OUTPATIENT
Start: 2024-06-05 | End: 2024-06-06

## 2024-06-05 RX ORDER — SERTRALINE 25 MG/1
150 TABLET, FILM COATED ORAL DAILY
Refills: 0 | Status: DISCONTINUED | OUTPATIENT
Start: 2024-06-05 | End: 2024-06-06

## 2024-06-05 RX ORDER — DILTIAZEM HCL 120 MG
120 CAPSULE, EXT RELEASE 24 HR ORAL DAILY
Refills: 0 | Status: DISCONTINUED | OUTPATIENT
Start: 2024-06-05 | End: 2024-06-06

## 2024-06-05 RX ORDER — DRONEDARONE 400 MG/1
1 TABLET, FILM COATED ORAL
Refills: 0 | DISCHARGE

## 2024-06-05 RX ORDER — AZITHROMYCIN 500 MG/1
500 TABLET, FILM COATED ORAL EVERY 24 HOURS
Refills: 0 | Status: DISCONTINUED | OUTPATIENT
Start: 2024-06-05 | End: 2024-06-06

## 2024-06-05 RX ORDER — FLUTICASONE PROPIONATE AND SALMETEROL 50; 250 UG/1; UG/1
1 POWDER ORAL; RESPIRATORY (INHALATION)
Refills: 0 | DISCHARGE

## 2024-06-05 RX ORDER — SERTRALINE 25 MG/1
0 TABLET, FILM COATED ORAL
Qty: 0 | Refills: 0 | DISCHARGE

## 2024-06-05 RX ORDER — MIRTAZAPINE 45 MG/1
7.5 TABLET, ORALLY DISINTEGRATING ORAL DAILY
Refills: 0 | Status: DISCONTINUED | OUTPATIENT
Start: 2024-06-05 | End: 2024-06-06

## 2024-06-05 RX ORDER — DRONEDARONE 400 MG/1
1 TABLET, FILM COATED ORAL
Qty: 0 | Refills: 0 | DISCHARGE

## 2024-06-05 RX ORDER — CALCIUM CARBONATE 500(1250)
1 TABLET ORAL EVERY 6 HOURS
Refills: 0 | Status: DISCONTINUED | OUTPATIENT
Start: 2024-06-05 | End: 2024-06-06

## 2024-06-05 RX ORDER — ALPRAZOLAM 0.25 MG
1 TABLET ORAL
Qty: 0 | Refills: 0 | DISCHARGE

## 2024-06-05 RX ORDER — FLUTICASONE PROPIONATE AND SALMETEROL 50; 250 UG/1; UG/1
1 POWDER ORAL; RESPIRATORY (INHALATION)
Qty: 0 | Refills: 0 | DISCHARGE

## 2024-06-05 RX ORDER — RIVAROXABAN 15 MG-20MG
0 KIT ORAL
Qty: 0 | Refills: 0 | DISCHARGE

## 2024-06-05 RX ORDER — AZITHROMYCIN 500 MG/1
500 TABLET, FILM COATED ORAL ONCE
Refills: 0 | Status: COMPLETED | OUTPATIENT
Start: 2024-06-05 | End: 2024-06-05

## 2024-06-05 RX ORDER — MIRTAZAPINE 45 MG/1
1 TABLET, ORALLY DISINTEGRATING ORAL
Qty: 0 | Refills: 0 | DISCHARGE

## 2024-06-05 RX ORDER — ALPRAZOLAM 0.25 MG
0 TABLET ORAL
Qty: 0 | Refills: 0 | DISCHARGE

## 2024-06-05 RX ORDER — ALPRAZOLAM 0.25 MG
0.25 TABLET ORAL THREE TIMES A DAY
Refills: 0 | Status: DISCONTINUED | OUTPATIENT
Start: 2024-06-05 | End: 2024-06-06

## 2024-06-05 RX ORDER — CEFTRIAXONE 500 MG/1
1000 INJECTION, POWDER, FOR SOLUTION INTRAMUSCULAR; INTRAVENOUS ONCE
Refills: 0 | Status: COMPLETED | OUTPATIENT
Start: 2024-06-05 | End: 2024-06-05

## 2024-06-05 RX ORDER — DRONEDARONE 400 MG/1
400 TABLET, FILM COATED ORAL
Refills: 0 | Status: DISCONTINUED | OUTPATIENT
Start: 2024-06-05 | End: 2024-06-06

## 2024-06-05 RX ORDER — LORATADINE 10 MG/1
1 TABLET ORAL
Qty: 0 | Refills: 0 | DISCHARGE

## 2024-06-05 RX ORDER — IPRATROPIUM/ALBUTEROL SULFATE 18-103MCG
3 AEROSOL WITH ADAPTER (GRAM) INHALATION EVERY 6 HOURS
Refills: 0 | Status: DISCONTINUED | OUTPATIENT
Start: 2024-06-05 | End: 2024-06-05

## 2024-06-05 RX ORDER — RIVAROXABAN 15 MG-20MG
15 KIT ORAL
Refills: 0 | Status: DISCONTINUED | OUTPATIENT
Start: 2024-06-05 | End: 2024-06-06

## 2024-06-05 RX ORDER — ACETAMINOPHEN 500 MG
650 TABLET ORAL EVERY 6 HOURS
Refills: 0 | Status: DISCONTINUED | OUTPATIENT
Start: 2024-06-05 | End: 2024-06-06

## 2024-06-05 RX ORDER — ALBUTEROL 90 UG/1
2.5 AEROSOL, METERED ORAL EVERY 8 HOURS
Refills: 0 | Status: DISCONTINUED | OUTPATIENT
Start: 2024-06-05 | End: 2024-06-06

## 2024-06-05 RX ORDER — ACETAMINOPHEN 500 MG
1000 TABLET ORAL ONCE
Refills: 0 | Status: COMPLETED | OUTPATIENT
Start: 2024-06-05 | End: 2024-06-05

## 2024-06-05 RX ORDER — RIVAROXABAN 15 MG-20MG
1 KIT ORAL
Qty: 0 | Refills: 0 | DISCHARGE

## 2024-06-05 RX ORDER — CEFTRIAXONE 500 MG/1
1000 INJECTION, POWDER, FOR SOLUTION INTRAMUSCULAR; INTRAVENOUS EVERY 24 HOURS
Refills: 0 | Status: DISCONTINUED | OUTPATIENT
Start: 2024-06-05 | End: 2024-06-06

## 2024-06-05 RX ORDER — SERTRALINE 25 MG/1
150 TABLET, FILM COATED ORAL
Qty: 0 | Refills: 0 | DISCHARGE

## 2024-06-05 RX ADMIN — Medication 0.25 MILLIGRAM(S): at 21:33

## 2024-06-05 RX ADMIN — DRONEDARONE 400 MILLIGRAM(S): 400 TABLET, FILM COATED ORAL at 18:11

## 2024-06-05 RX ADMIN — ALBUTEROL 2.5 MILLIGRAM(S): 90 AEROSOL, METERED ORAL at 23:28

## 2024-06-05 RX ADMIN — MIRTAZAPINE 7.5 MILLIGRAM(S): 45 TABLET, ORALLY DISINTEGRATING ORAL at 21:33

## 2024-06-05 RX ADMIN — Medication 400 MILLIGRAM(S): at 10:11

## 2024-06-05 RX ADMIN — CEFTRIAXONE 100 MILLIGRAM(S): 500 INJECTION, POWDER, FOR SOLUTION INTRAMUSCULAR; INTRAVENOUS at 10:41

## 2024-06-05 RX ADMIN — SERTRALINE 150 MILLIGRAM(S): 25 TABLET, FILM COATED ORAL at 16:46

## 2024-06-05 RX ADMIN — CEFTRIAXONE 1000 MILLIGRAM(S): 500 INJECTION, POWDER, FOR SOLUTION INTRAMUSCULAR; INTRAVENOUS at 11:11

## 2024-06-05 RX ADMIN — Medication 1 TABLET(S): at 19:49

## 2024-06-05 RX ADMIN — AZITHROMYCIN 255 MILLIGRAM(S): 500 TABLET, FILM COATED ORAL at 12:38

## 2024-06-05 RX ADMIN — Medication 1000 MILLIGRAM(S): at 10:30

## 2024-06-05 RX ADMIN — AZITHROMYCIN 500 MILLIGRAM(S): 500 TABLET, FILM COATED ORAL at 12:35

## 2024-06-05 RX ADMIN — Medication 120 MILLIGRAM(S): at 16:39

## 2024-06-05 RX ADMIN — Medication 10 MILLIGRAM(S): at 16:46

## 2024-06-05 RX ADMIN — RIVAROXABAN 15 MILLIGRAM(S): KIT at 16:39

## 2024-06-05 RX ADMIN — AZITHROMYCIN 255 MILLIGRAM(S): 500 TABLET, FILM COATED ORAL at 11:35

## 2024-06-05 RX ADMIN — ALBUTEROL 2.5 MILLIGRAM(S): 90 AEROSOL, METERED ORAL at 15:51

## 2024-06-05 RX ADMIN — Medication 650 MILLIGRAM(S): at 21:33

## 2024-06-05 RX ADMIN — CEFTRIAXONE 100 MILLIGRAM(S): 500 INJECTION, POWDER, FOR SOLUTION INTRAMUSCULAR; INTRAVENOUS at 10:35

## 2024-06-05 RX ADMIN — Medication 650 MILLIGRAM(S): at 22:15

## 2024-06-05 NOTE — ED ADULT TRIAGE NOTE - CHIEF COMPLAINT QUOTE
89 y/o female presents axo3 via stretcher, bibems from home c/o shortness of breath/low o2 sat 88% on room air, 94% via nasal canula at 2lpm.

## 2024-06-05 NOTE — ED ADULT NURSE NOTE - IS THE PATIENT ABLE TO BE SCREENED?
Cheilitis Normal Treatment: I recommended application of Vaseline or Aquaphor numerous times a day (as often as every hour) and before going to bed. Yes

## 2024-06-05 NOTE — CONSULT NOTE ADULT - SUBJECTIVE AND OBJECTIVE BOX
Date/Time Patient Seen:  		  Referring MD:   Data Reviewed	       Patient is a 89y old  Female who presents with a chief complaint of     Subjective/HPI   89 year old female with a history of asthma, anxiety, atrial fibrillation, chronic diastolic heart failure who presents with shortness of breath  PAST MEDICAL & SURGICAL HISTORY:  Asthma    Atrial fibrillation    Macular degeneration (senile) of retina    Presbycusis of both ears  hearing aid    History of hip replacement, total, right    Anxiety    Acute congestive heart failure, unspecified heart failure type    Anxiety    Asthma    Atrial fibrillation    Presbycusis of both ears    Macular degeneration    No significant past surgical history    History of hip replacement, total, right    History of knee replacement, total, left    History of hip fracture  left hip, additional femoral rad for distal fx.    Status post cataract surgery, left    Status post cataract surgery, right    S/P cataract surgery, left    S/P cataract surgery, right    Hip fracture, left    Status post total right knee replacement    History of knee replacement, total, left    History of hip replacement, total, bilateral    Family History: Non-contributory family history of premature cardiovascular atherosclerotic disease    Social History: No tobacco, alcohol or drug use    Review of Systems:  General: No fevers, chills, weight gain  Skin: No rashes, color changes  Cardiovascular: No chest pain, orthopnea  Respiratory: +shortness of breath, cough  Gastrointestinal: No nausea, abdominal pain  Genitourinary: No incontinence, pain with urination  Musculoskeletal: No pain, swelling, decreased range of motion  Neurological: No headache, weakness  Psychiatric: No depression, anxiety  Endocrine: No weight gain, increased thirst  All other systems are comprehensively negative.      Medication list         MEDICATIONS  (STANDING):    MEDICATIONS  (PRN):         Vitals log        ICU Vital Signs Last 24 Hrs  T(C): 37.9 (05 Jun 2024 09:48), Max: 37.9 (05 Jun 2024 09:48)  T(F): 100.2 (05 Jun 2024 09:48), Max: 100.2 (05 Jun 2024 09:48)  HR: 68 (05 Jun 2024 12:01) (68 - 74)  BP: 108/71 (05 Jun 2024 12:01) (108/71 - 119/71)  BP(mean): --  ABP: --  ABP(mean): --  RR: 18 (05 Jun 2024 12:01) (18 - 18)  SpO2: 96% (05 Jun 2024 12:01) (94% - 96%)    O2 Parameters below as of 05 Jun 2024 12:01  Patient On (Oxygen Delivery Method): nasal cannula                 Input and Output:  I&O's Detail      Lab Data                        7.3    5.60  )-----------( 168      ( 05 Jun 2024 10:12 )             23.3     06-05    142  |  106  |  28<H>  ----------------------------<  101<H>  3.9   |  29  |  1.25    Ca    8.5      05 Jun 2024 10:12    TPro  6.2  /  Alb  2.5<L>  /  TBili  0.4  /  DBili  x   /  AST  15  /  ALT  17  /  AlkPhos  56  06-05      CARDIAC MARKERS ( 05 Jun 2024 10:12 )  x     / x     / 64 U/L / x     / x            Review of Systems	  weakness  cough  occ wheeze  frailty  malaise      Objective     Physical Examination        Pertinent Lab findings & Imaging      Morris:  NO   Adequate UO     I&O's Detail           Discussed with:     Cultures:	        Radiology      ACC: [id number] EXAM:  CT CHEST   ORDERED BY: HEATH VARGAS     PROCEDURE DATE:  06/05/2024          INTERPRETATION:  EXAMINATION: CT CHEST    CLINICAL INDICATION: Dyspnea.    TECHNIQUE: Noncontrast CT of the chest was obtained.    COMPARISON: 7/24/2020.    FINDINGS:    AIRWAYS AND LUNGS: The central tracheobronchial tree is patent.  Mild   peripheral reticulation. A few patchy bilateral lung opacities. Minimal   bronchiectasis.    MEDIASTINUM AND PLEURA: Mildly enlarged mediastinal lymph nodes. The   visualized portion of the thyroid gland is unremarkable. There are small   bilateral pleural effusions.  There is no pneumothorax.    HEART AND VESSELS: There is cardiomegaly.  There are atherosclerotic   calcifications of the aorta and coronary arteries.  There is no   pericardial effusion.    UPPER ABDOMEN: Images of the upper abdomen demonstrate hepatic and renal   cyst.    BONES AND SOFT TISSUES: There are mild degenerative changes of the spine.    The soft tissues are unremarkable.    IMPRESSION:  Mild peripheral reticulation. A few patchy bilateral lung opacities may   be infectious or inflammatory. Minimal bronchiectasis.    --- End of Report ---            MARTINEZ NGUYEN MD; Attending Radiologist  This document has been electronically signed. Jun 5 2024 11:53AM                        
  HPI:  89YO F PMH asthma, atrial fibrillation, CHF, macular degeneration from home who presented to the hospital with c/o  shortness of breath and cough recently.  No known fever or chills n/v/d CP abd pain urinary symptoms. In ED afebrile wbc wnl. CT chest with tucker infiltrates.     Infectious Disease consult was called to evaluate pt and for antibiotic management.        Past Medical & Surgical Hx:  PAST MEDICAL & SURGICAL HISTORY:  Asthma  Atrial fibrillation  Macular degeneration (senile) of retina  Presbycusis of both ears  hearing aid  History of hip replacement, total, right  Anxiety  Acute congestive heart failure, unspecified heart failure type  Anxiety  Asthma  Atrial fibrillation  Presbycusis of both ears  Macular degeneration  History of hip replacement, total, right  History of knee replacement, total, left  History of hip fracture  left hip, additional femoral rad for distal fx.  Status post cataract surgery, left  Status post cataract surgery, right  S/P cataract surgery, left  S/P cataract surgery, right  Hip fracture, left  History of knee replacement, total, left  History of hip replacement, total, bilateral    Social History--  EtOH: denies  Tobacco: denies   Drug Use: denies      FAMILY HISTORY:  Family history of Hodgkin's disease (Father)    Family history of pneumonia (Mother)    Family history of pancreatic cancer (Sibling)        Allergies  amoxicillin (Rash)  shellfish (Anaphylaxis)  sulfamethoxazole (Unknown)    Intolerances  NONE      Home Medications:  acetaminophen 325 mg oral tablet: 2 tab(s) orally every 6 hours As needed Temp greater or equal to 38C (100.4F), Mild Pain (1 - 3) (06 Jun 2024 08:45)  Advair Diskus 500 mcg-50 mcg inhalation powder: 1 puff(s) inhaled 3 times a day as needed for  bronchospasm (05 Jun 2024 10:58)  albuterol 2.5 mg/3 mL (0.083%) inhalation solution: 2 inhaler(s) inhaled 4 times a day (06 Jun 2024 08:45)  aluminum hydroxide-magnesium hydroxide 200 mg-200 mg/5 mL oral suspension: 30 milliliter(s) orally every 8 hours As needed Dyspepsia (06 Jun 2024 08:45)  azithromycin 500 mg intravenous injection: 500 milligram(s) intravenous every 24 hours (06 Jun 2024 08:45)  bisacodyl 5 mg oral delayed release tablet: 1 tab(s) orally every 12 hours As needed Constipation (06 Jun 2024 08:45)  calcium carbonate 500 mg (200 mg elemental calcium) oral tablet, chewable: 1 tab(s) orally every 6 hours As needed Heartburn (06 Jun 2024 08:45)  cefTRIAXone 1 g injection: 1 gram(s) intravenously once a day (06 Jun 2024 09:03)  dilTIAZem 120 mg/24 hours oral capsule, extended release: 1 cap(s) orally once a day (06 Jun 2024 08:45)  mirtazapine 7.5 mg oral tablet: 1 tab(s) orally once a day (06 Jun 2024 08:45)  Multaq 400 mg oral tablet: 1 tab(s) orally once a day (05 Jun 2024 10:58)  rivaroxaban 15 mg oral tablet: 1 tab(s) orally once a day (before a meal) (06 Jun 2024 08:45)  sertraline 50 mg oral tablet: 3 tab(s) orally once a day (06 Jun 2024 08:45)  torsemide 10 mg oral tablet: 1 tab(s) orally once a day (05 Jun 2024 10:58)  Xanax 0.25 mg oral tablet: 1 tab(s) orally 3 times a day, As Needed (05 Jun 2024 09:55)      Current Inpatient Medications :    ANTIBIOTICS:   azithromycin  IVPB 500 milliGRAM(s) IV Intermittent every 24 hours  cefTRIAXone   IVPB 1000 milliGRAM(s) IV Intermittent every 24 hours      OTHER RELEVANT MEDICATIONS :  acetaminophen     Tablet .. 650 milliGRAM(s) Oral every 6 hours PRN  albuterol    0.083% 2.5 milliGRAM(s) Nebulizer every 8 hours  ALPRAZolam 0.25 milliGRAM(s) Oral three times a day PRN  aluminum hydroxide/magnesium hydroxide/simethicone Suspension 30 milliLiter(s) Oral every 8 hours PRN  bisacodyl 5 milliGRAM(s) Oral every 12 hours PRN  budesonide  80 MICROgram(s)/formoterol 4.5 MICROgram(s) Inhaler 2 Puff(s) Inhalation two times a day  calcium carbonate    500 mG (Tums) Chewable 1 Tablet(s) Chew every 6 hours PRN  diltiazem    milliGRAM(s) Oral daily  dronedarone 400 milliGRAM(s) Oral two times a day  guaiFENesin Oral Liquid (Sugar-Free) 200 milliGRAM(s) Oral every 6 hours PRN  mirtazapine 7.5 milliGRAM(s) Oral daily  rivaroxaban 15 milliGRAM(s) Oral with dinner  sertraline 150 milliGRAM(s) Oral daily  torsemide 10 milliGRAM(s) Oral daily      ROS:  Unable to obtain due to : poor historian    Physical Exam:  T(C): 37.9 (05 Jun 2024 09:48), Max: 37.9 (05 Jun 2024 09:48)  T(F): 100.2 (05 Jun 2024 09:48), Max: 100.2 (05 Jun 2024 09:48)  HR: 68 (05 Jun 2024 12:01) (68 - 74)  BP: 108/71 (05 Jun 2024 12:01) (108/71 - 119/71)  RR: 18 (05 Jun 2024 12:01) (18 - 18)  SpO2: 96% (05 Jun 2024 12:01) (94% - 96%)    O2 Parameters below as of 05 Jun 2024 12:01  Patient On (Oxygen Delivery Method): nasal cannula      Height (cm): 160 (06-05 @ 23:44)  Weight (kg): 65.8 (06-05 @ 23:44)  BMI (kg/m2): 25.7 (06-05 @ 23:44)  BSA (m2): 1.69 (06-05 @ 23:44)    General: Anxious no acute distress  Neck: supple, trachea midline  Lungs: decreased, no wheeze/rhonchi  Cardiovascular: regular rate and rhythm, S1 S2  Abdomen: soft, nontender, ND, bowel sounds normal  Neurological:  alert and oriented x3  Skin: no rash  Extremities: no edema    Labs:              7.3    5.60  )-----------( 168      ( 05 Jun 2024 10:12 )             23.3     06-05    142  |  106  |  28<H>  ----------------------------<  101<H>  3.9   |  29  |  1.25    Ca    8.5      05 Jun 2024 10:12    TPro  6.2  /  Alb  2.5<L>  /  TBili  0.4  /  DBili  x   /  AST  15  /  ALT  17  /  AlkPhos  56  06-05    RECENT CULTURES:          RADIOLOGY & ADDITIONAL STUDIES:    ACC: [id number] EXAM:  CT CHEST   ORDERED BY: HEATH VARGAS     PROCEDURE DATE:  06/05/2024          INTERPRETATION:  EXAMINATION: CT CHEST    CLINICAL INDICATION: Dyspnea.    TECHNIQUE: Noncontrast CT of the chest was obtained.    COMPARISON: 7/24/2020.    FINDINGS:    AIRWAYS AND LUNGS: The central tracheobronchial tree is patent.  Mild   peripheral reticulation. A few patchy bilateral lung opacities. Minimal   bronchiectasis.    MEDIASTINUM AND PLEURA: Mildly enlarged mediastinal lymph nodes.The   visualized portion of the thyroid gland is unremarkable. There are small   bilateral pleural effusions.  There is no pneumothorax.    HEART AND VESSELS: There is cardiomegaly.  There are atherosclerotic   calcifications of the aorta and coronary arteries.  There is no   pericardial effusion.    UPPER ABDOMEN: Images of the upper abdomen demonstrate hepatic and renal   cyst.    BONES AND SOFT TISSUES: There are mild degenerative changes of the spine.    The soft tissues are unremarkable.    IMPRESSION:  Mild peripheral reticulation. A few patchy bilateral lung opacities may   be infectious or inflammatory. Minimal bronchiectasis.    Assessment :   89YO F PMH asthma, atrial fibrillation, CHF, macular degeneration from home who presented to the hospital with c/o  shortness of breath and cough recently.  No known fever or chills n/v/d CP abd pain urinary symptoms. In ED afebrile wbc wnl. CT chest with tucker infiltrates.   Likely CAP ?atelectasis    Plan :   Cont Rocephin Zithromax  Fu cultures  Trend temps and cbc  Asp precautions    Continue with present regiment .  Approptiate use of antibiotics and adverse effects reviewed.      > 45 minutes spent in direct patient care reviewing  the notes, lab data/ imaging , discussion with multidisciplinary team. All questions were addressed and answered to the best of my capacity .    Thank you for allowing me to participate in the care of your patient .      Cally Merino MD  Infectious Disease  [phone]
History of Present Illness: The patient is a 89 year old female with a history of asthma, anxiety, atrial fibrillation, chronic diastolic heart failure who presents with shortness of breath. She has had shortness of breath and cough over the past 1-2 days. There has been wheezing at times. No chest pain or leg swelling.    Past Medical/Surgical History:  asthma, anxiety, atrial fibrillation, chronic diastolic heart failure    Medications:  Home Medications:  Advair Diskus 500 mcg-50 mcg inhalation powder: 1 puff(s) inhaled 3 times a day as needed for  bronchospasm (05 Jun 2024 10:58)  Cardizem  mg/24 hours oral capsule, extended release: 1 cap(s) orally once a day (05 Jun 2024 10:58)  Claritin 10 mg oral tablet: 1 tab(s) orally once a day (05 Jun 2024 10:58)  Flonase 50 mcg/inh nasal spray: 1 spray(s) in each nostril once a day (05 Jun 2024 10:58)  mirtazapine 7.5 mg oral tablet: 1 tab(s) orally once a day (at bedtime) (05 Jun 2024 10:58)  Multaq 400 mg oral tablet: 1 tab(s) orally once a day (05 Jun 2024 10:58)  potassium chloride 10 mEq oral capsule, extended release: 1 cap(s) orally once a day (05 Jun 2024 10:58)  sertraline 150 mg oral capsule: 1 cap(s) orally once a day (05 Jun 2024 10:58)  torsemide 10 mg oral tablet: 1 tab(s) orally once a day (05 Jun 2024 10:58)  Xanax 0.25 mg oral tablet: 1 tab(s) orally 3 times a day, As Needed (05 Jun 2024 09:55)  Xanax 0.25 mg oral tablet: 1 tab(s) orally 3 times a day as needed for  anxiety (05 Jun 2024 10:58)  Xarelto 15 mg oral tablet: 1 tab(s) orally once a day (05 Jun 2024 10:58)      Family History: Non-contributory family history of premature cardiovascular atherosclerotic disease    Social History: No tobacco, alcohol or drug use    Review of Systems:  General: No fevers, chills, weight gain  Skin: No rashes, color changes  Cardiovascular: No chest pain, orthopnea  Respiratory: +shortness of breath, cough  Gastrointestinal: No nausea, abdominal pain  Genitourinary: No incontinence, pain with urination  Musculoskeletal: No pain, swelling, decreased range of motion  Neurological: No headache, weakness  Psychiatric: No depression, anxiety  Endocrine: No weight gain, increased thirst  All other systems are comprehensively negative.    Physical Exam:  Vitals:        Vital Signs Last 24 Hrs  T(C): 37.9 (05 Jun 2024 09:48), Max: 37.9 (05 Jun 2024 09:48)  T(F): 100.2 (05 Jun 2024 09:48), Max: 100.2 (05 Jun 2024 09:48)  HR: 72 (05 Jun 2024 09:42) (72 - 72)  BP: 119/71 (05 Jun 2024 09:42) (119/71 - 119/71)  BP(mean): --  RR: 18 (05 Jun 2024 09:42) (18 - 18)  SpO2: 94% (05 Jun 2024 09:42) (94% - 94%)  Parameters below as of 05 Jun 2024 09:42  Patient On (Oxygen Delivery Method): nasal cannula  O2 Flow (L/min): 2  General: NAD  HEENT: MMM  Neck: No JVD, no carotid bruit  Lungs: CTAB  CV: RRR, nl S1/S2, no M/R/G  Abdomen: S/NT/ND, +BS  Extremities: No LE edema, no cyanosis  Neuro: AAOx3, non-focal  Skin: No rash    Labs:                        7.3    5.60  )-----------( 168      ( 05 Jun 2024 10:12 )             23.3     06-05    142  |  106  |  28<H>  ----------------------------<  101<H>  3.9   |  29  |  1.25    Ca    8.5      05 Jun 2024 10:12    TPro  6.2  /  Alb  2.5<L>  /  TBili  0.4  /  DBili  x   /  AST  15  /  ALT  17  /  AlkPhos  56  06-05    CARDIAC MARKERS ( 05 Jun 2024 10:12 )  x     / x     / 64 U/L / x     / x          PT/INR - ( 05 Jun 2024 10:12 )   PT: 20.3 sec;   INR: 1.90 ratio         PTT - ( 05 Jun 2024 10:12 )  PTT:37.2 sec    ECG/Telemetry: Atrial fibrillation, normal axis, inferior Q waves

## 2024-06-05 NOTE — CONSULT NOTE ADULT - ASSESSMENT
The patient is a 89 year old female with a history of asthma, anxiety, atrial fibrillation, chronic diastolic heart failure who presents with shortness of breath.    Plan:  - Shortness of breath possibly due to PNA and/or acute on chronic diastolic heart failure  - ECG with known atrial fibrillation; cannot exclude old inferior MI  - Check echo  - BNP 6218  - CXR with PNA vs. asymmetric pulm edema  - Check CT chest  - Will start diuretics pending results of CT chest  - Continue dronedarone 400 mg bid  - Continue diltiazem  mg daily  - Continue rivaroxaban 15 mg daily  - IV antibiotics  - Pulm eval

## 2024-06-05 NOTE — CONSULT NOTE ADULT - ASSESSMENT
89 year old female with a history of asthma, anxiety, atrial fibrillation, chronic diastolic heart failure who presents with shortness of breath    anemia  weakness  malaise  URI  OP  OA  Asthma  Anxiety  AF  HFpEF    symbicort  albuterol  cough rx regimen  tylenol PRN  assist with needs  biomarkers -   CT chest reviewed - ? chr changes vs acute -   URI - sx management - acap and cough rx regimen  cardio eval noted - cvs rx regimen and BP control, AF - rate and rhythm control  TTE  eval for HFpEF exacerbation, proBNP noted  monitor VS and HD and Sat  fio2 wean as tolerated  GOC discussion  oral hygiene  skin care

## 2024-06-05 NOTE — ED ADULT NURSE NOTE - CAS TRG GENERAL AIRWAY, MLM
6/5/2024              America Curiel        [address]        [place] [id number]         To Whom It May Concern ,     America Curiel is currently under my care. She has severe osteoarthritis in her lower extremities, and requires a walker.  It allows her to be more active.    Thank you for your attention.  Please let my office know if you need further information.    Sincerely,            Jesus Luz MD  Rheumatology          Document electronically generated by:  Jesus Luz MD   
Patent

## 2024-06-05 NOTE — ED PROVIDER NOTE - CLINICAL SUMMARY MEDICAL DECISION MAKING FREE TEXT BOX
Acute shortness of breath with hypoxia, history of CHF.  Will check labs, x-ray, RVP, proBNP, D-dimer, admission

## 2024-06-05 NOTE — ED PROVIDER NOTE - OBJECTIVE STATEMENT
89-year-old female with a history of asthma, atrial fibrillation, CHF, macular degeneration presents with brought in by EMS from home for shortness of breath today.  Patient with some mild cough recently.  No known fever or chills.  No chest pain.  The patient is feeling short of breath, and was found to have an O2 sat of around 87 to 88% on room air at home.  Patient came up to 93 to 94% on 2 L nasal cannula.  No vomiting or diarrhea.  No known travel or trauma.  No lower extremity edema.  No aggravating or alleviating factors otherwise noted.  No other history available.

## 2024-06-05 NOTE — PATIENT PROFILE ADULT - FALL HARM RISK - HARM RISK INTERVENTIONS

## 2024-06-05 NOTE — ED PROVIDER NOTE - CARE PLAN
Principal Discharge DX:	Shortness of breath  Secondary Diagnosis:	Hypoxia  Secondary Diagnosis:	Pulmonary infiltrate   1

## 2024-06-05 NOTE — H&P ADULT - ASSESSMENT
89-year-old female with a history of asthma, atrial fibrillation, CHF, macular degeneration presents with brought in by EMS from home for shortness of breath today.  Patient with some mild cough recently.  No known fever or chills.  No chest pain.  The patient is feeling short of breath, and was found to have an O2 sat of around 87 to 88% on room air at home.  Patient came up to 93 to 94% on 2 L nasal cannula.  No vomiting or diarrhea.  No known travel or trauma.  No lower extremity edema.  No aggravating or alleviating factors otherwise noted.  No other history available.      Acute Hypoxic respiratory Failure   Oxygen Nebs   CT chest reviewed inflammatory process b/l opacities consistent with Pneumonia   iv abx   Follow with cultures       CHF Continue with Torsemide   Afib Multaq and Xarelto     PT eval   Speech swallow eval

## 2024-06-05 NOTE — ED PROVIDER NOTE - PROGRESS NOTE DETAILS
Discussion with the patient's daughter.  The patient may have briefly aspirated some water yesterday morning.  The patient did have some possible shaking chills yesterday.  Otherwise no known recent infection, no known sick contacts.  she agrees with admission. Discussed with Dr. Rubio, will see patient to admit.

## 2024-06-05 NOTE — H&P ADULT - GASTROINTESTINAL
Lauren Carrier is a 10 month old male who was brought in for his Well Baby visit. History was provided by caregiver  HPI:   Patient presents for:  Well Baby    Past Medical History  History reviewed. No pertinent past medical history.     Past Surgical Hi Constitutional:  appears well hydrated, alert and responsive, no acute distress noted  Head/Face:  head is normocephalic, anterior fontanelle is normal for age  Eyes/Vision: pupils  Round and equally reactive to light and red reflexes are present b provided    Follow up in 3 months    06/30/21  Bert Clark MD soft/nontender/nondistended/normal active bowel sounds

## 2024-06-05 NOTE — ED PROVIDER NOTE - DIFFERENTIAL DIAGNOSIS
Differential Diagnosis Rule out acute pneumonia, other URI, CHF, electrolyte abnormality, flu/COVID, other acute pathology

## 2024-06-05 NOTE — GOALS OF CARE CONVERSATION - ADVANCED CARE PLANNING - CONVERSATION DETAILS
Palliative care SW met with patient and daughter at bedside in ER. Patient very [place] and deferred to daughter. Reviewed patient's medical and social history as well as events leading to patient's hospitalization. Writer discussed patient's current diagnosis (SOB, Hypoxia, Chronic CHF, Asthma, Afib, Macular degeneration, Anxiety), medical condition and management. Daughter reports patient has a HCP, Living Will and POA at home. Patient confirmed daughter Amparo is primary health care agent. Inquired about patient's wishes regarding extent of medical care to be provided including thoughts regarding cardiopulmonary resuscitation and mechanical ventilation/intubation. Daughter states that everything should be done for patient. She feels that overall patient is "not that sick" explaining that up until a week ago was "riding her stationary bike."  Daughter states that if patient's health were to deteriorate she would consider DNR/DNI orders. Reviewed with daughter when CPR occurs and the possible outcomes of given patient's advanced age and medical issues. Daughter verbalized understanding. Patient remains a full code. All questions answered. Psychosocial support provided.

## 2024-06-05 NOTE — H&P ADULT - HISTORY OF PRESENT ILLNESS
89-year-old female with a history of asthma, atrial fibrillation, CHF, macular degeneration presents with brought in by EMS from home for shortness of breath today.  Patient with some mild cough recently.  No known fever or chills.  No chest pain.  The patient is feeling short of breath, and was found to have an O2 sat of around 87 to 88% on room air at home.  Patient came up to 93 to 94% on 2 L nasal cannula.  No vomiting or diarrhea.  No known travel or trauma.  No lower extremity edema.  No aggravating or alleviating factors otherwise noted.  No other history available.    Daughter Bed side provided the history

## 2024-06-06 ENCOUNTER — TRANSCRIPTION ENCOUNTER (OUTPATIENT)
Age: 89
End: 2024-06-06

## 2024-06-06 VITALS
HEART RATE: 103 BPM | SYSTOLIC BLOOD PRESSURE: 125 MMHG | OXYGEN SATURATION: 90 % | DIASTOLIC BLOOD PRESSURE: 53 MMHG | RESPIRATION RATE: 18 BRPM | TEMPERATURE: 99 F

## 2024-06-06 LAB
CRP SERPL-MCNC: 217 MG/L — HIGH
CULTURE RESULTS: NO GROWTH — SIGNIFICANT CHANGE UP
PROCALCITONIN SERPL-MCNC: 0.09 NG/ML — SIGNIFICANT CHANGE UP (ref 0.02–0.1)
SPECIMEN SOURCE: SIGNIFICANT CHANGE UP

## 2024-06-06 PROCEDURE — 80053 COMPREHEN METABOLIC PANEL: CPT

## 2024-06-06 PROCEDURE — 85025 COMPLETE CBC W/AUTO DIFF WBC: CPT

## 2024-06-06 PROCEDURE — 93306 TTE W/DOPPLER COMPLETE: CPT

## 2024-06-06 PROCEDURE — 96365 THER/PROPH/DIAG IV INF INIT: CPT

## 2024-06-06 PROCEDURE — 0225U NFCT DS DNA&RNA 21 SARSCOV2: CPT

## 2024-06-06 PROCEDURE — 82803 BLOOD GASES ANY COMBINATION: CPT

## 2024-06-06 PROCEDURE — 93005 ELECTROCARDIOGRAM TRACING: CPT

## 2024-06-06 PROCEDURE — 71045 X-RAY EXAM CHEST 1 VIEW: CPT

## 2024-06-06 PROCEDURE — 84484 ASSAY OF TROPONIN QUANT: CPT

## 2024-06-06 PROCEDURE — 71250 CT THORAX DX C-: CPT | Mod: MC

## 2024-06-06 PROCEDURE — 85730 THROMBOPLASTIN TIME PARTIAL: CPT

## 2024-06-06 PROCEDURE — 81003 URINALYSIS AUTO W/O SCOPE: CPT

## 2024-06-06 PROCEDURE — 83880 ASSAY OF NATRIURETIC PEPTIDE: CPT

## 2024-06-06 PROCEDURE — 96375 TX/PRO/DX INJ NEW DRUG ADDON: CPT

## 2024-06-06 PROCEDURE — 94640 AIRWAY INHALATION TREATMENT: CPT

## 2024-06-06 PROCEDURE — 99285 EMERGENCY DEPT VISIT HI MDM: CPT

## 2024-06-06 PROCEDURE — 92610 EVALUATE SWALLOWING FUNCTION: CPT

## 2024-06-06 PROCEDURE — 83605 ASSAY OF LACTIC ACID: CPT

## 2024-06-06 PROCEDURE — 84145 PROCALCITONIN (PCT): CPT

## 2024-06-06 PROCEDURE — 87086 URINE CULTURE/COLONY COUNT: CPT

## 2024-06-06 PROCEDURE — 82550 ASSAY OF CK (CPK): CPT

## 2024-06-06 PROCEDURE — 87040 BLOOD CULTURE FOR BACTERIA: CPT

## 2024-06-06 PROCEDURE — 85610 PROTHROMBIN TIME: CPT

## 2024-06-06 PROCEDURE — 36415 COLL VENOUS BLD VENIPUNCTURE: CPT

## 2024-06-06 PROCEDURE — 97161 PT EVAL LOW COMPLEX 20 MIN: CPT

## 2024-06-06 PROCEDURE — 86140 C-REACTIVE PROTEIN: CPT

## 2024-06-06 RX ORDER — LORATADINE 10 MG/1
1 TABLET ORAL
Refills: 0 | DISCHARGE

## 2024-06-06 RX ORDER — RIVAROXABAN 15 MG-20MG
1 KIT ORAL
Qty: 0 | Refills: 0 | DISCHARGE
Start: 2024-06-06

## 2024-06-06 RX ORDER — MIRTAZAPINE 45 MG/1
1 TABLET, ORALLY DISINTEGRATING ORAL
Refills: 0 | DISCHARGE

## 2024-06-06 RX ORDER — CEFTRIAXONE 500 MG/1
1 INJECTION, POWDER, FOR SOLUTION INTRAMUSCULAR; INTRAVENOUS
Qty: 0 | Refills: 0 | DISCHARGE

## 2024-06-06 RX ORDER — ACETAMINOPHEN 500 MG
2 TABLET ORAL
Qty: 0 | Refills: 0 | DISCHARGE
Start: 2024-06-06

## 2024-06-06 RX ORDER — AZITHROMYCIN 500 MG/1
500 TABLET, FILM COATED ORAL
Qty: 0 | Refills: 0 | DISCHARGE
Start: 2024-06-06

## 2024-06-06 RX ORDER — CEFTRIAXONE 500 MG/1
0 INJECTION, POWDER, FOR SOLUTION INTRAMUSCULAR; INTRAVENOUS
Qty: 0 | Refills: 0 | DISCHARGE
Start: 2024-06-06

## 2024-06-06 RX ORDER — CALCIUM CARBONATE 500(1250)
1 TABLET ORAL
Qty: 0 | Refills: 0 | DISCHARGE
Start: 2024-06-06

## 2024-06-06 RX ORDER — MIRTAZAPINE 45 MG/1
1 TABLET, ORALLY DISINTEGRATING ORAL
Qty: 0 | Refills: 0 | DISCHARGE
Start: 2024-06-06

## 2024-06-06 RX ORDER — POTASSIUM CHLORIDE 20 MEQ
1 PACKET (EA) ORAL
Refills: 0 | DISCHARGE

## 2024-06-06 RX ORDER — DILTIAZEM HCL 120 MG
1 CAPSULE, EXT RELEASE 24 HR ORAL
Qty: 0 | Refills: 0 | DISCHARGE
Start: 2024-06-06

## 2024-06-06 RX ORDER — DILTIAZEM HCL 120 MG
1 CAPSULE, EXT RELEASE 24 HR ORAL
Refills: 0 | DISCHARGE

## 2024-06-06 RX ORDER — ALBUTEROL 90 UG/1
2 AEROSOL, METERED ORAL
Qty: 0 | Refills: 0 | DISCHARGE
Start: 2024-06-06

## 2024-06-06 RX ORDER — FLUTICASONE PROPIONATE 50 MCG
1 SPRAY, SUSPENSION NASAL
Refills: 0 | DISCHARGE

## 2024-06-06 RX ORDER — SERTRALINE 25 MG/1
1 TABLET, FILM COATED ORAL
Refills: 0 | DISCHARGE

## 2024-06-06 RX ORDER — RIVAROXABAN 15 MG-20MG
1 KIT ORAL
Refills: 0 | DISCHARGE

## 2024-06-06 RX ORDER — SERTRALINE 25 MG/1
3 TABLET, FILM COATED ORAL
Qty: 0 | Refills: 0 | DISCHARGE
Start: 2024-06-06

## 2024-06-06 RX ORDER — ALPRAZOLAM 0.25 MG
1 TABLET ORAL
Refills: 0 | DISCHARGE

## 2024-06-06 RX ADMIN — AZITHROMYCIN 255 MILLIGRAM(S): 500 TABLET, FILM COATED ORAL at 15:34

## 2024-06-06 RX ADMIN — RIVAROXABAN 15 MILLIGRAM(S): KIT at 17:16

## 2024-06-06 RX ADMIN — BUDESONIDE AND FORMOTEROL FUMARATE DIHYDRATE 2 PUFF(S): 160; 4.5 AEROSOL RESPIRATORY (INHALATION) at 18:45

## 2024-06-06 RX ADMIN — DRONEDARONE 400 MILLIGRAM(S): 400 TABLET, FILM COATED ORAL at 05:31

## 2024-06-06 RX ADMIN — Medication 650 MILLIGRAM(S): at 08:16

## 2024-06-06 RX ADMIN — Medication 0.25 MILLIGRAM(S): at 20:23

## 2024-06-06 RX ADMIN — Medication 10 MILLIGRAM(S): at 05:31

## 2024-06-06 RX ADMIN — SERTRALINE 150 MILLIGRAM(S): 25 TABLET, FILM COATED ORAL at 13:22

## 2024-06-06 RX ADMIN — CEFTRIAXONE 100 MILLIGRAM(S): 500 INJECTION, POWDER, FOR SOLUTION INTRAMUSCULAR; INTRAVENOUS at 13:22

## 2024-06-06 RX ADMIN — Medication 120 MILLIGRAM(S): at 05:31

## 2024-06-06 RX ADMIN — DRONEDARONE 400 MILLIGRAM(S): 400 TABLET, FILM COATED ORAL at 18:45

## 2024-06-06 RX ADMIN — MIRTAZAPINE 7.5 MILLIGRAM(S): 45 TABLET, ORALLY DISINTEGRATING ORAL at 20:18

## 2024-06-06 RX ADMIN — ALBUTEROL 2.5 MILLIGRAM(S): 90 AEROSOL, METERED ORAL at 07:33

## 2024-06-06 NOTE — PATIENT CHOICE NOTE. - NSPTCHOICENOTES_GEN_ALL_CORE
[facility] care agency [phone] will reach out to you within 24-72 hours of your discharge to schedule home care visit/eval appointment with you. Please call agency for any queries regarding home care services

## 2024-06-06 NOTE — PROGRESS NOTE ADULT - SUBJECTIVE AND OBJECTIVE BOX
Date/Time Patient Seen:  		  Referring MD:   Data Reviewed	       Patient is a 89y old  Female who presents with a chief complaint of SOB x 1 day (05 Jun 2024 23:44)      Subjective/HPI     PAST MEDICAL & SURGICAL HISTORY:  Asthma    Atrial fibrillation    Macular degeneration (senile) of retina    Presbycusis of both ears  hearing aid    History of hip replacement, total, right    Anxiety    Acute congestive heart failure, unspecified heart failure type    Anxiety    Asthma    Atrial fibrillation    Presbycusis of both ears    Macular degeneration    No significant past surgical history    History of hip replacement, total, right    History of knee replacement, total, left    History of hip fracture  left hip, additional femoral rad for distal fx.    Status post cataract surgery, left    Status post cataract surgery, right    S/P cataract surgery, left    S/P cataract surgery, right    Hip fracture, left    Status post total right knee replacement    History of knee replacement, total, left    History of hip replacement, total, bilateral          Medication list         MEDICATIONS  (STANDING):  albuterol    0.083% 2.5 milliGRAM(s) Nebulizer every 8 hours  azithromycin  IVPB 500 milliGRAM(s) IV Intermittent every 24 hours  budesonide  80 MICROgram(s)/formoterol 4.5 MICROgram(s) Inhaler 2 Puff(s) Inhalation two times a day  cefTRIAXone   IVPB 1000 milliGRAM(s) IV Intermittent every 24 hours  diltiazem    milliGRAM(s) Oral daily  dronedarone 400 milliGRAM(s) Oral two times a day  mirtazapine 7.5 milliGRAM(s) Oral daily  rivaroxaban 15 milliGRAM(s) Oral with dinner  sertraline 150 milliGRAM(s) Oral daily  torsemide 10 milliGRAM(s) Oral daily    MEDICATIONS  (PRN):  acetaminophen     Tablet .. 650 milliGRAM(s) Oral every 6 hours PRN Temp greater or equal to 38C (100.4F), Mild Pain (1 - 3)  ALPRAZolam 0.25 milliGRAM(s) Oral three times a day PRN anxiety  aluminum hydroxide/magnesium hydroxide/simethicone Suspension 30 milliLiter(s) Oral every 8 hours PRN Dyspepsia  bisacodyl 5 milliGRAM(s) Oral every 12 hours PRN Constipation  calcium carbonate    500 mG (Tums) Chewable 1 Tablet(s) Chew every 6 hours PRN Heartburn  guaiFENesin Oral Liquid (Sugar-Free) 200 milliGRAM(s) Oral every 6 hours PRN Cough         Vitals log        ICU Vital Signs Last 24 Hrs  T(C): 36.6 (05 Jun 2024 16:16), Max: 37.9 (05 Jun 2024 09:48)  T(F): 97.9 (05 Jun 2024 16:16), Max: 100.2 (05 Jun 2024 09:48)  HR: 76 (05 Jun 2024 16:16) (68 - 76)  BP: 126/58 (05 Jun 2024 16:16) (108/71 - 126/58)  BP(mean): --  ABP: --  ABP(mean): --  RR: 17 (05 Jun 2024 16:16) (17 - 18)  SpO2: 97% (05 Jun 2024 16:16) (94% - 98%)    O2 Parameters below as of 05 Jun 2024 16:16  Patient On (Oxygen Delivery Method): nasal cannula  O2 Flow (L/min): 2               Input and Output:  I&O's Detail      Lab Data                        7.3    5.60  )-----------( 168      ( 05 Jun 2024 10:12 )             23.3     06-05    142  |  106  |  28<H>  ----------------------------<  101<H>  3.9   |  29  |  1.25    Ca    8.5      05 Jun 2024 10:12    TPro  6.2  /  Alb  2.5<L>  /  TBili  0.4  /  DBili  x   /  AST  15  /  ALT  17  /  AlkPhos  56  06-05      CARDIAC MARKERS ( 05 Jun 2024 10:12 )  x     / x     / 64 U/L / x     / x            Review of Systems	      Objective     Physical Examination    heart s1s2  lung dc BS  head nc      Pertinent Lab findings & Imaging      Arturo:  NO   Adequate UO     I&O's Detail           Discussed with:     Cultures:	        Radiology

## 2024-06-06 NOTE — PROGRESS NOTE ADULT - SUBJECTIVE AND OBJECTIVE BOX
JT LANDRY is a 89yFemale , patient examined and chart reviewed.    INTERVAL HPI/ OVERNIGHT EVENTS:   Anxious. Family at bedside.  Afebrile.    PAST MEDICAL & SURGICAL HISTORY:  Asthma  Atrial fibrillation  Macular degeneration (senile) of retina  Presbycusis of both ears  hearing aid  History of hip replacement, total, right  Anxiety  Acute congestive heart failure, unspecified heart failure type  History of hip replacement, total, right  History of knee replacement, total, left  History of hip fracture  left hip, additional femoral rad for distal fx.  Status post cataract surgery, left  Status post cataract surgery, right  S/P cataract surgery, left  S/P cataract surgery, right  Hip fracture, left  History of knee replacement, total, left  History of hip replacement, total, bilateral      For details regarding the patient's social history, family history, and other miscellaneous elements, please refer the initial infectious diseases consultation and/or the admitting history and physical examination for this admission.     ROS:  CONSTITUTIONAL:  Negative fever or chills  EYES:  Negative  blurry vision or double vision  CARDIOVASCULAR:  Negative for chest pain or palpitations  RESPIRATORY: + cough  SOB   GASTROINTESTINAL:  Negative for nausea, vomiting, diarrhea, constipation, or abdominal pain  GENITOURINARY:  Negative frequency, urgency or dysuria  NEUROLOGIC:  No headache, confusion, dizziness, lightheadedness  All other systems were reviewed and are negative     ALLERGIES  amoxicillin (Rash)  shellfish (Anaphylaxis)  sulfamethoxazole (Unknown)      Current inpatient medications :    ANTIBIOTICS/RELEVANT:  azithromycin  IVPB 500 milliGRAM(s) IV Intermittent every 24 hours  cefTRIAXone   IVPB 1000 milliGRAM(s) IV Intermittent every 24 hours      acetaminophen     Tablet .. 650 milliGRAM(s) Oral every 6 hours PRN  albuterol    0.083% 2.5 milliGRAM(s) Nebulizer every 8 hours  ALPRAZolam 0.25 milliGRAM(s) Oral three times a day PRN  aluminum hydroxide/magnesium hydroxide/simethicone Suspension 30 milliLiter(s) Oral every 8 hours PRN  bisacodyl 5 milliGRAM(s) Oral every 12 hours PRN  budesonide  80 MICROgram(s)/formoterol 4.5 MICROgram(s) Inhaler 2 Puff(s) Inhalation two times a day  calcium carbonate    500 mG (Tums) Chewable 1 Tablet(s) Chew every 6 hours PRN  diltiazem    milliGRAM(s) Oral daily  dronedarone 400 milliGRAM(s) Oral two times a day  guaiFENesin Oral Liquid (Sugar-Free) 200 milliGRAM(s) Oral every 6 hours PRN  mirtazapine 7.5 milliGRAM(s) Oral daily  rivaroxaban 15 milliGRAM(s) Oral with dinner  sertraline 150 milliGRAM(s) Oral daily  torsemide 10 milliGRAM(s) Oral daily      Objective:    T(C): 37.1 (06-06-24 @ 20:35), Max: 37.1 (06-06-24 @ 20:35)  HR: 103 (06-06-24 @ 20:35) (74 - 103)  BP: 125/53 (06-06-24 @ 20:35) (116/55 - 132/57)  RR: 18 (06-06-24 @ 20:35) (17 - 18)  SpO2: 90% (06-06-24 @ 20:35) (90% - 96%)      Physical Exam:  General:  no acute distress  Neck: supple, trachea midline  Lungs: decreased, no wheeze/rhonchi  Cardiovascular: regular rate and rhythm, S1 S2  Abdomen: soft, nontender,  bowel sounds normal  Neurological: alert and oriented x3  Skin: no rash  Extremities: no cyanosis/clubbing/edema      LABS:                          7.3    5.60  )-----------( 168      ( 05 Jun 2024 10:12 )             23.3       06-05    142  |  106  |  28<H>  ----------------------------<  101<H>  3.9   |  29  |  1.25    Ca    8.5      05 Jun 2024 10:12    TPro  6.2  /  Alb  2.5<L>  /  TBili  0.4  /  DBili  x   /  AST  15  /  ALT  17  /  AlkPhos  56  06-05      PT/INR - ( 05 Jun 2024 10:12 )   PT: 20.3 sec;   INR: 1.90 ratio         PTT - ( 05 Jun 2024 10:12 )  PTT:37.2 sec    MICROBIOLOGY:    Culture - Blood (collected 05 Jun 2024 11:18)  Source: .Blood Blood-Peripheral  Preliminary Report (06 Jun 2024 18:02):    No growth at 24 hours    Culture - Blood (collected 05 Jun 2024 11:17)  Source: .Blood Blood-Peripheral  Preliminary Report (06 Jun 2024 18:02):    No growth at 24 hours    Culture - Urine (collected 05 Jun 2024 10:12)  Source: Clean Catch Clean Catch (Midstream)  Final Report (06 Jun 2024 11:51):    No growth        RADIOLOGY & ADDITIONAL STUDIES:    ACC: [id number] EXAM:  CT CHEST   ORDERED BY: HEATH VARGAS     PROCEDURE DATE:  06/05/2024          INTERPRETATION:  EXAMINATION: CT CHEST    CLINICAL INDICATION: Dyspnea.    TECHNIQUE: Noncontrast CT of the chest was obtained.    COMPARISON: 7/24/2020.    FINDINGS:    AIRWAYS AND LUNGS: The central tracheobronchial tree is patent.  Mild   peripheral reticulation. A few patchy bilateral lung opacities. Minimal   bronchiectasis.    MEDIASTINUM AND PLEURA: Mildly enlarged mediastinal lymph nodes.The   visualized portion of the thyroid gland is unremarkable. There are small   bilateral pleural effusions.  There is no pneumothorax.    HEART AND VESSELS: There is cardiomegaly.  There are atherosclerotic   calcifications of the aorta and coronary arteries.  There is no   pericardial effusion.    UPPER ABDOMEN: Images of the upper abdomen demonstrate hepatic and renal   cyst.    BONES AND SOFT TISSUES: There are mild degenerative changes of the spine.    The soft tissues are unremarkable.    IMPRESSION:  Mild peripheral reticulation. A few patchy bilateral lung opacities may   be infectious or inflammatory. Minimal bronchiectasis.    Assessment :   89YO F PMH asthma, atrial fibrillation, CHF, macular degeneration from home admitted with shortness of breath and cough sec ? CAP   Cultures NGTD  Procal 0.09    Plan :   Cont Rocephin Zithromax  Fu cultures  Trend temps and cbc  Legionella  and pneumococcal antigen  Asp precautions  Pulm toileting      Continue with present regiment.  Appropriate use of antibiotics and adverse effects reviewed.      I have discussed the above plan of care with patient and daughter in detail. They expressed understanding of the  treatment plan . Risks, benefits and alternatives discussed in detail. I have asked if they have any questions or concerns and appropriately addressed them to the best of my ability .    > 35 minutes were spent in direct patient care reviewing notes, medications ,labs data/ imaging , discussion with multidisciplinary team.    Thank you for allowing me to participate in care of your patient .    Cally Merino MD  Infectious Disease  [phone]      JT LANDRY is a 89yFemale , patient examined and chart reviewed.    INTERVAL HPI/ OVERNIGHT EVENTS:   Anxious. Family at bedside.  Afebrile.    PAST MEDICAL & SURGICAL HISTORY:  Asthma  Atrial fibrillation  Macular degeneration (senile) of retina  Presbycusis of both ears  hearing aid  History of hip replacement, total, right  Anxiety  Acute congestive heart failure, unspecified heart failure type  History of hip replacement, total, right  History of knee replacement, total, left  History of hip fracture  left hip, additional femoral rad for distal fx.  Status post cataract surgery, left  Status post cataract surgery, right  S/P cataract surgery, left  S/P cataract surgery, right  Hip fracture, left  History of knee replacement, total, left  History of hip replacement, total, bilateral      For details regarding the patient's social history, family history, and other miscellaneous elements, please refer the initial infectious diseases consultation and/or the admitting history and physical examination for this admission.     ROS:  CONSTITUTIONAL:  Negative fever or chills  EYES:  Negative  blurry vision or double vision  CARDIOVASCULAR:  Negative for chest pain or palpitations  RESPIRATORY: + cough  SOB   GASTROINTESTINAL:  Negative for nausea, vomiting, diarrhea, constipation, or abdominal pain  GENITOURINARY:  Negative frequency, urgency or dysuria  NEUROLOGIC:  No headache, confusion, dizziness, lightheadedness  All other systems were reviewed and are negative     ALLERGIES  amoxicillin (Rash)  shellfish (Anaphylaxis)  sulfamethoxazole (Unknown)      Current inpatient medications :    ANTIBIOTICS/RELEVANT:  azithromycin  IVPB 500 milliGRAM(s) IV Intermittent every 24 hours  cefTRIAXone   IVPB 1000 milliGRAM(s) IV Intermittent every 24 hours      acetaminophen     Tablet .. 650 milliGRAM(s) Oral every 6 hours PRN  albuterol    0.083% 2.5 milliGRAM(s) Nebulizer every 8 hours  ALPRAZolam 0.25 milliGRAM(s) Oral three times a day PRN  aluminum hydroxide/magnesium hydroxide/simethicone Suspension 30 milliLiter(s) Oral every 8 hours PRN  bisacodyl 5 milliGRAM(s) Oral every 12 hours PRN  budesonide  80 MICROgram(s)/formoterol 4.5 MICROgram(s) Inhaler 2 Puff(s) Inhalation two times a day  calcium carbonate    500 mG (Tums) Chewable 1 Tablet(s) Chew every 6 hours PRN  diltiazem    milliGRAM(s) Oral daily  dronedarone 400 milliGRAM(s) Oral two times a day  guaiFENesin Oral Liquid (Sugar-Free) 200 milliGRAM(s) Oral every 6 hours PRN  mirtazapine 7.5 milliGRAM(s) Oral daily  rivaroxaban 15 milliGRAM(s) Oral with dinner  sertraline 150 milliGRAM(s) Oral daily  torsemide 10 milliGRAM(s) Oral daily      Objective:    T(C): 37.1 (06-06-24 @ 20:35), Max: 37.1 (06-06-24 @ 20:35)  HR: 103 (06-06-24 @ 20:35) (74 - 103)  BP: 125/53 (06-06-24 @ 20:35) (116/55 - 132/57)  RR: 18 (06-06-24 @ 20:35) (17 - 18)  SpO2: 90% (06-06-24 @ 20:35) (90% - 96%)      Physical Exam:  General:  no acute distress  Neck: supple, trachea midline  Lungs: decreased, no wheeze/rhonchi  Cardiovascular: regular rate and rhythm, S1 S2  Abdomen: soft, nontender,  bowel sounds normal  Neurological: alert and oriented x3  Skin: no rash  Extremities: no cyanosis/clubbing/edema      LABS:                          7.3    5.60  )-----------( 168      ( 05 Jun 2024 10:12 )             23.3       06-05    142  |  106  |  28<H>  ----------------------------<  101<H>  3.9   |  29  |  1.25    Ca    8.5      05 Jun 2024 10:12    TPro  6.2  /  Alb  2.5<L>  /  TBili  0.4  /  DBili  x   /  AST  15  /  ALT  17  /  AlkPhos  56  06-05      PT/INR - ( 05 Jun 2024 10:12 )   PT: 20.3 sec;   INR: 1.90 ratio         PTT - ( 05 Jun 2024 10:12 )  PTT:37.2 sec    MICROBIOLOGY:    Culture - Blood (collected 05 Jun 2024 11:18)  Source: .Blood Blood-Peripheral  Preliminary Report (06 Jun 2024 18:02):    No growth at 24 hours    Culture - Blood (collected 05 Jun 2024 11:17)  Source: .Blood Blood-Peripheral  Preliminary Report (06 Jun 2024 18:02):    No growth at 24 hours    Culture - Urine (collected 05 Jun 2024 10:12)  Source: Clean Catch Clean Catch (Midstream)  Final Report (06 Jun 2024 11:51):    No growth    Respiratory Viral Panel with COVID-19 by HOMAR (06.05.24 @ 10:12)    Rapid RVP Result: Detected   SARS-CoV-2: NotDete: This Respiratory Panel uses polymerase chain reaction (PCR) to detect for  adenovirus; coronavirus (HKU1, NL63, 229E, OC43); human metapneumovirus  (hMPV); human enterovirus/rhinovirus (Entero/RV); influenza A; influenza  A/H1; influenza A/H3; influenza A/H1-2009; influenza B; parainfluenza  viruses 1, 2, 3, 4; respiratory syncytial virus; Mycoplasma pneumoniae;  Chlamydophila pneumoniae; and SARS-CoV-2.   Entero/Rhinovirus (RapRVP): Detected        RADIOLOGY & ADDITIONAL STUDIES:    ACC: [id number] EXAM:  CT CHEST   ORDERED BY: HEATH VARGAS     PROCEDURE DATE:  06/05/2024          INTERPRETATION:  EXAMINATION: CT CHEST    CLINICAL INDICATION: Dyspnea.    TECHNIQUE: Noncontrast CT of the chest was obtained.    COMPARISON: 7/24/2020.    FINDINGS:    AIRWAYS AND LUNGS: The central tracheobronchial tree is patent.  Mild   peripheral reticulation. A few patchy bilateral lung opacities. Minimal   bronchiectasis.    MEDIASTINUM AND PLEURA: Mildly enlarged mediastinal lymph nodes.The   visualized portion of the thyroid gland is unremarkable. There are small   bilateral pleural effusions.  There is no pneumothorax.    HEART AND VESSELS: There is cardiomegaly.  There are atherosclerotic   calcifications of the aorta and coronary arteries.  There is no   pericardial effusion.    UPPER ABDOMEN: Images of the upper abdomen demonstrate hepatic and renal   cyst.    BONES AND SOFT TISSUES: There are mild degenerative changes of the spine.    The soft tissues are unremarkable.    IMPRESSION:  Mild peripheral reticulation. A few patchy bilateral lung opacities may   be infectious or inflammatory. Minimal bronchiectasis.    Assessment :   89YO F PMH asthma, atrial fibrillation, CHF, macular degeneration from home admitted with shortness of breath and cough sec viral bronchitis  ? CAP CT Chest noted ?chronic changes atelectasis  Cultures NGTD  Procal 0.09    Plan :   Cont empiric Rocephin Zithromax  Fu cultures  Trend temps and cbc  Legionella  and pneumococcal antigen  Asp precautions  Pulm toileting  Daughter requesting transfer to [facility]    Continue with present regiment.  Appropriate use of antibiotics and adverse effects reviewed.      I have discussed the above plan of care with patient and daughter in detail. They expressed understanding of the  treatment plan . Risks, benefits and alternatives discussed in detail. I have asked if they have any questions or concerns and appropriately addressed them to the best of my ability .    > 35 minutes were spent in direct patient care reviewing notes, medications ,labs data/ imaging , discussion with multidisciplinary team.    Thank you for allowing me to participate in care of your patient .    Cally Merino MD  Infectious Disease  [phone]

## 2024-06-06 NOTE — CARE COORDINATION ASSESSMENT. - NSCAREPROVIDERS_GEN_ALL_CORE_FT
CARE PROVIDERS:  Accepting Physician: Sruthi Rubio  Administration: Yasmine Sahni  Administration: Monse Vasquez  Administration: Hosea Gruber  Admitting: Sruthi Rubio  Attending: Sruthi Rubio  Consultant: Cally Merino  Consultant: Abdoulaye Whalen  Consultant: Mani Lechuga  Consultant: Marcelina Bill  Covering Team: Ruddy Jensen  ED Attending: Kendall Polanco  ED Nurse: Tameka Tran  Emergency Medicine: Jesus Ruiz  Infection Control: Tania Escobedo  Nurse: Christa Paz  Nurse: Lacy Wright  Outpatient Provider: Andrews Larsen  Override: Ayelen Valencia  Override: Kitty Barr  Override: Katharine Purdy  PCA/Nursing Assistant: Rocio Gaitan  PCA/Nursing Assistant: Guera Santiago  Physical Therapy: Ana Doyle  Physical Therapy: Giacomo Tai  Physical Therapy: Ariana Bautista  Registered Dietitian: Alina Triana  Respiratory Therapy: Felipe Alcantara  Respiratory Therapy: Tien Aguilar  : Christina Lawton  Speech Pathology: Aubrey Leon

## 2024-06-06 NOTE — DISCHARGE NOTE PROVIDER - NSDCMRMEDTOKEN_GEN_ALL_CORE_FT
acetaminophen 325 mg oral tablet: 2 tab(s) orally every 6 hours As needed Temp greater or equal to 38C (100.4F), Mild Pain (1 - 3)  Advair Diskus 500 mcg-50 mcg inhalation powder: 1 puff(s) inhaled 3 times a day as needed for  bronchospasm  albuterol 2.5 mg/3 mL (0.083%) inhalation solution: 2 inhaler(s) inhaled 4 times a day  aluminum hydroxide-magnesium hydroxide 200 mg-200 mg/5 mL oral suspension: 30 milliliter(s) orally every 8 hours As needed Dyspepsia  azithromycin 500 mg intravenous injection: 500 milligram(s) intravenous every 24 hours  bisacodyl 5 mg oral delayed release tablet: 1 tab(s) orally every 12 hours As needed Constipation  calcium carbonate 500 mg (200 mg elemental calcium) oral tablet, chewable: 1 tab(s) orally every 6 hours As needed Heartburn  cefTRIAXone: iv daily  dilTIAZem 120 mg/24 hours oral capsule, extended release: 1 cap(s) orally once a day  mirtazapine 7.5 mg oral tablet: 1 tab(s) orally once a day  Multaq 400 mg oral tablet: 1 tab(s) orally once a day  rivaroxaban 15 mg oral tablet: 1 tab(s) orally once a day (before a meal)  sertraline 50 mg oral tablet: 3 tab(s) orally once a day  torsemide 10 mg oral tablet: 1 tab(s) orally once a day  Xanax 0.25 mg oral tablet: 1 tab(s) orally 3 times a day, As Needed   acetaminophen 325 mg oral tablet: 2 tab(s) orally every 6 hours As needed Temp greater or equal to 38C (100.4F), Mild Pain (1 - 3)  Advair Diskus 500 mcg-50 mcg inhalation powder: 1 puff(s) inhaled 3 times a day as needed for  bronchospasm  albuterol 2.5 mg/3 mL (0.083%) inhalation solution: 2 inhaler(s) inhaled 4 times a day  aluminum hydroxide-magnesium hydroxide 200 mg-200 mg/5 mL oral suspension: 30 milliliter(s) orally every 8 hours As needed Dyspepsia  azithromycin 500 mg intravenous injection: 500 milligram(s) intravenous every 24 hours  bisacodyl 5 mg oral delayed release tablet: 1 tab(s) orally every 12 hours As needed Constipation  calcium carbonate 500 mg (200 mg elemental calcium) oral tablet, chewable: 1 tab(s) orally every 6 hours As needed Heartburn  cefTRIAXone 1 g injection: 1 gram(s) intravenously once a day  dilTIAZem 120 mg/24 hours oral capsule, extended release: 1 cap(s) orally once a day  mirtazapine 7.5 mg oral tablet: 1 tab(s) orally once a day  Multaq 400 mg oral tablet: 1 tab(s) orally once a day  rivaroxaban 15 mg oral tablet: 1 tab(s) orally once a day (before a meal)  sertraline 50 mg oral tablet: 3 tab(s) orally once a day  torsemide 10 mg oral tablet: 1 tab(s) orally once a day  Xanax 0.25 mg oral tablet: 1 tab(s) orally 3 times a day, As Needed

## 2024-06-06 NOTE — SWALLOW BEDSIDE ASSESSMENT ADULT - ASR SWALLOW RECOMMEND DIAG
MBS was recommended to be considered given chest imaging results. Discussed recommendation for MBS with with Dr. Rubio at which time she stated to defer objective testing at this time. MBS was recommended to be considered given chest imaging results. Discussed recommendation for MBS with with Dr. Rubio at which time she stated to defer objective testing at this time. It is recommended that the pt's MD refer for objective testing at MD discretion should concern for aspiration arise.

## 2024-06-06 NOTE — CARE COORDINATION ASSESSMENT. - NSPASTMEDSURGHISTORY_GEN_ALL_CORE_FT
PAST MEDICAL & SURGICAL HISTORY:  History of hip replacement, total, right      Presbycusis of both ears  hearing aid      Macular degeneration (senile) of retina      Atrial fibrillation      Asthma      Status post cataract surgery, right      Status post cataract surgery, left      History of hip fracture  left hip, additional femoral rad for distal fx.      History of knee replacement, total, left      History of hip replacement, total, right      Anxiety      Macular degeneration      Presbycusis of both ears      Atrial fibrillation      Asthma      Anxiety      Acute congestive heart failure, unspecified heart failure type      History of hip replacement, total, bilateral      History of knee replacement, total, left      Hip fracture, left      S/P cataract surgery, right      S/P cataract surgery, left

## 2024-06-06 NOTE — PATIENT CHOICE NOTE. - NSPTCHOICESTATE_GEN_ALL_CORE

## 2024-06-06 NOTE — PROGRESS NOTE ADULT - SUBJECTIVE AND OBJECTIVE BOX
Chief Complaint: Shortness of breath    Interval Events: No events overnight.    Review of Systems:  General: No fevers, chills, weight gain  Skin: No rashes, color changes  Cardiovascular: No chest pain, orthopnea  Respiratory: No shortness of breath, cough  Gastrointestinal: No nausea, abdominal pain  Genitourinary: No incontinence, pain with urination  Musculoskeletal: No pain, swelling, decreased range of motion  Neurological: No headache, weakness  Psychiatric: No depression, anxiety  Endocrine: No weight gain, increased thirst  All other systems are comprehensively negative.    Physical Exam:  Vitals:        Vital Signs Last 24 Hrs  T(C): 36.6 (06 Jun 2024 05:14), Max: 37.9 (05 Jun 2024 09:48)  T(F): 97.8 (06 Jun 2024 05:14), Max: 100.2 (05 Jun 2024 09:48)  HR: 79 (06 Jun 2024 05:14) (68 - 79)  BP: 132/57 (06 Jun 2024 05:14) (108/71 - 132/57)  BP(mean): --  RR: 17 (06 Jun 2024 05:14) (17 - 18)  SpO2: 96% (06 Jun 2024 05:14) (94% - 98%)  Parameters below as of 06 Jun 2024 05:14  Patient On (Oxygen Delivery Method): nasal cannula  General: NAD  HEENT: MMM  Neck: No JVD, no carotid bruit  Lungs: CTAB  CV: RRR, nl S1/S2, no M/R/G  Abdomen: S/NT/ND, +BS  Extremities: No LE edema, no cyanosis  Neuro: AAOx3, non-focal  Skin: No rash    Labs:                        7.3    5.60  )-----------( 168      ( 05 Jun 2024 10:12 )             23.3     06-05    142  |  106  |  28<H>  ----------------------------<  101<H>  3.9   |  29  |  1.25    Ca    8.5      05 Jun 2024 10:12    TPro  6.2  /  Alb  2.5<L>  /  TBili  0.4  /  DBili  x   /  AST  15  /  ALT  17  /  AlkPhos  56  06-05    CARDIAC MARKERS ( 05 Jun 2024 10:12 )  x     / x     / 64 U/L / x     / x          PT/INR - ( 05 Jun 2024 10:12 )   PT: 20.3 sec;   INR: 1.90 ratio         PTT - ( 05 Jun 2024 10:12 )  PTT:37.2 sec    ECG/Telemetry: Atrial fibrillation

## 2024-06-06 NOTE — PHYSICAL THERAPY INITIAL EVALUATION ADULT - ADDITIONAL COMMENTS
Pt lives with daughter and 24/7 aide at home with no steps to enter and none inside. Pt uses a rolling walker and is very active at home. Pt states that she lifts weight and has been taking yoga classes for the past 35 years. Pt's daughter is very involved in her care.

## 2024-06-06 NOTE — PROGRESS NOTE ADULT - ASSESSMENT
The patient is a 89 year old female with a history of asthma, anxiety, atrial fibrillation, chronic diastolic heart failure who presents with shortness of breath.    Plan:  - ECG with known atrial fibrillation; cannot exclude old inferior MI  - Echo with normal LV systolic function  - BNP 6218  - CXR with PNA vs. asymmetric pulm edema  - CT chest with possible PNA  - RVP positive for rhinovirus  - Continue dronedarone 400 mg bid  - Continue diltiazem  mg daily  - Continue rivaroxaban 15 mg daily  - Continue torsemide 10 mg daily  - IV antibiotics  - Pulm follow-up
89 year old female with a history of asthma, anxiety, atrial fibrillation, chronic diastolic heart failure who presents with shortness of breath    anemia  weakness  malaise  URI  OP  OA  Asthma  Anxiety  AF  HFpEF    procal and crp noted  tte report pending  vs noted  fio2 wean    symbicort  albuterol  cough rx regimen  tylenol PRN  assist with needs  biomarkers -   CT chest reviewed - ? chr changes vs acute -   URI - sx management - acap and cough rx regimen  cardio eval noted - cvs rx regimen and BP control, AF - rate and rhythm control  TTE  eval for HFpEF exacerbation, proBNP noted  monitor VS and HD and Sat  fio2 wean as tolerated  GOC discussion  oral hygiene  skin care

## 2024-06-06 NOTE — CARE COORDINATION ASSESSMENT. - PRO ARRIVE FROM
DX:89-year-old female with a history of asthma, atrial fibrillation, CHF, macular degeneration presents with brought in by EMS from home for shortness of breath today.  Patient with some mild cough recently.  No known fever or chills.  No chest pain.  The patient is feeling short of breath, and was found to have an O2 sat of around 87 to 88% on room air at home.    Pending Transfer to [facility].     CM met with patient at bedside. Patient is alert times 3 but very [place]. Patient is on 2 liters nasal cannula of oxygen. CM spoke with daughter Amparo [phone] daughter stated by lives with 24/7 aide service at her house.  Patient stated she owns a cane and a walker. Patient has no steps to enter in the house.  Daughter stated patient does not wear oxygen at home.       CM verified:    PCP: DR. Griffin [phone].  Pharmacy: [facility] [phone].  Insurance: Medicare/ AARP.  [place]: Patient stated No.     CM explained about homecare services with patient, with verbal understanding. CM will setup referral, and continue to follow case./home

## 2024-06-06 NOTE — SWALLOW BEDSIDE ASSESSMENT ADULT - COMMENTS
"89-year-old female with a history of asthma, atrial fibrillation, CHF, macular degeneration presents with brought in by EMS from home for shortness of breath today.  Patient with some mild cough recently.  No known fever or chills.  No chest pain.  The patient is feeling short of breath, and was found to have an O2 sat of around 87 to 88% on room air at home.  Patient came up to 93 to 94% on 2 L nasal cannula.  No vomiting or diarrhea.  No known travel or trauma.  No lower extremity edema.  No aggravating or alleviating factors otherwise noted.  No other history available.  Daughter Bed side provided the history "     CT Chest 6/5/24: "IMPRESSION:  Mild peripheral reticulation. A few patchy bilateral lung opacities may   be infectious or inflammatory. Minimal bronchiectasis."     X-ray Chest 6/5/24:   "Impression:  There is increased interstitial markings greater right than left. This   could represent asymmetric pulmonary edema versus pneumonia.  Possible small bilateral pleural effusions"

## 2024-06-06 NOTE — CASE MANAGEMENT PROGRESS NOTE - NSCMPROGRESSNOTE_GEN_ALL_CORE
Update Communication Note: As per morning rounds on [address], patient Pending Transfer to [facility]. Will continue to follow patient.

## 2024-06-06 NOTE — CAREGIVER ENGAGEMENT NOTE - CAREGIVER NAME
Evenlyn  daughter  Patient settled into room 3011 in stable condition. Report received from Sonny PAK. Baby band verified. Room orientation provided. IPOC and education initiated.

## 2024-06-06 NOTE — CARE COORDINATION ASSESSMENT. - OTHER PERTINENT DISCHARGE PLANNING INFORMATION:
89-year-old female with a history of asthma, atrial fibrillation, CHF, macular degeneration presents with brought in by EMS from home for shortness of breath today.  Patient with some mild cough recently.  No known fever or chills.  No chest pain. Met patient at bedside.  Explained role of CM, verbalized understanding. Pt was made aware a CM will remain available through hospitalization.  Contact information given in discharge/ transitions resource folder.

## 2024-06-06 NOTE — SWALLOW BEDSIDE ASSESSMENT ADULT - SWALLOW EVAL: DIAGNOSIS
The pt presented with functional oral stage of the swallow for puree, soft and bite sized, regular solids, and thin liquids. Pharyngeal stage of the swallow was suspected to be functional for thin liquids, puree, soft and bite sized, and regular solids as pharyngeal swallow trigger was suspected to be timely, hyolaryngeal excursion/elevation was appreciated, and no overt s/s of aspiration or penetration noted. The pt presented with functional oral stage of the swallow for puree, soft and bite sized, regular solids, and thin liquids. Pharyngeal stage of the swallow was suspected to be functional for thin liquids, puree, soft and bite sized, and regular solids as pharyngeal swallow trigger was suspected to be timely, hyolaryngeal excursion/elevation was appreciated upon palpation, and no overt s/s of aspiration or penetration noted.

## 2024-06-06 NOTE — PHYSICAL THERAPY INITIAL EVALUATION ADULT - PERTINENT HX OF CURRENT PROBLEM, REHAB EVAL
89-year-old female with a history of asthma, atrial fibrillation, CHF, macular degeneration presents with brought in by EMS from home for shortness of breath today.  Patient with some mild cough recently.  No known fever or chills.  No chest pain.  The patient is feeling short of breath, and was found to have an O2 sat of around 87 to 88% on room air at home.  Patient came up to 93 to 94% on 2 L nasal cannula.

## 2024-06-06 NOTE — DISCHARGE NOTE PROVIDER - NSDCCPCAREPLAN_GEN_ALL_CORE_FT
PRINCIPAL DISCHARGE DIAGNOSIS  Diagnosis: Shortness of breath  Assessment and Plan of Treatment: Pneumonia iv abx   To be [facility]nasferrred to [facility]      SECONDARY DISCHARGE DIAGNOSES  Diagnosis: Hypoxia  Assessment and Plan of Treatment:     Diagnosis: Pulmonary infiltrate  Assessment and Plan of Treatment:

## 2024-06-06 NOTE — PHYSICAL THERAPY INITIAL EVALUATION ADULT - NSPTDISCHREC_GEN_A_CORE
Pt has decreased strength and endurance and would benefit from HCPT services to increase strength, balance and endurance to increase function and decrease risk of falls./Home PT

## 2024-06-06 NOTE — SWALLOW BEDSIDE ASSESSMENT ADULT - DIET PRIOR TO ADMI
Regular diet with thin liquids (per pt) Per pt's RN and Dr. Rubio, pt's daughter stated that the pt consumes regular diet with thin liquids at baseline.

## 2024-06-06 NOTE — DISCHARGE NOTE PROVIDER - PROVIDER TOKENS
FREE:[LAST:[SHIRA],FIRST:[ANAYELI],PHONE:[(   )    -],FAX:[(   )    -],ADDRESS:[[facility]]]

## 2024-06-06 NOTE — SWALLOW BEDSIDE ASSESSMENT ADULT - SLP GENERAL OBSERVATIONS
The pt was received at bedside. She was awake, alert, and in NAD. Pt with (+) baseline cough prior to trials being provided. The pt reported a baseline diet of regular solids with thin liquids. She denied any hx of difficulty chewing or swallowing. Pt was seated upright for purposes of the evaluation. Following the evaluation, the pt was left with HOB elevated in bed. She was awake, alert, and in NAD.

## 2024-06-06 NOTE — CARE COORDINATION ASSESSMENT. - NSDCPLANSERVICES_GEN_ALL_CORE
DX:89-year-old female with a history of asthma, atrial fibrillation, CHF, macular degeneration presents with brought in by EMS from home for shortness of breath today.  Patient with some mild cough recently.  No known fever or chills.  No chest pain.  The patient is feeling short of breath, and was found to have an O2 sat of around 87 to 88% on room air at home.    Pending Transfer to [facility].     CM met with patient at bedside. Patient is alert times 3 but very [place]. Patient is on 2 liters nasal cannula of oxygen. CM spoke with daughter Amparo [phone] daughter stated by lives with 24/7 aide service at her house.  Patient stated she owns a cane and a walker. Patient has no steps to enter in the house.  Daughter stated patient does not wear oxygen at home.       CM verified:    PCP: DR. Griffin [phone].  Pharmacy: [facility] [phone].  Insurance: Medicare/ AARP.  : Patient stated No.     CM explained about homecare services with patient, with verbal understanding. CM will setup referral, and continue to follow case./Anticipated Needs Unclear at Present

## 2024-06-10 LAB
CULTURE RESULTS: SIGNIFICANT CHANGE UP
CULTURE RESULTS: SIGNIFICANT CHANGE UP
SPECIMEN SOURCE: SIGNIFICANT CHANGE UP
SPECIMEN SOURCE: SIGNIFICANT CHANGE UP